# Patient Record
Sex: FEMALE | Race: WHITE | NOT HISPANIC OR LATINO | Employment: FULL TIME | URBAN - NONMETROPOLITAN AREA
[De-identification: names, ages, dates, MRNs, and addresses within clinical notes are randomized per-mention and may not be internally consistent; named-entity substitution may affect disease eponyms.]

---

## 2023-05-03 ENCOUNTER — HOSPITAL ENCOUNTER (EMERGENCY)
Facility: OTHER | Age: 23
Discharge: LEFT BEFORE DISCHARGE | End: 2023-05-03
Payer: COMMERCIAL

## 2023-05-03 VITALS
TEMPERATURE: 98.7 F | RESPIRATION RATE: 16 BRPM | OXYGEN SATURATION: 99 % | SYSTOLIC BLOOD PRESSURE: 120 MMHG | DIASTOLIC BLOOD PRESSURE: 51 MMHG | HEART RATE: 67 BPM

## 2023-05-04 NOTE — ED TRIAGE NOTES
Pt arrives with c/o headache for three days, pt states it isn't really pain, more so discomfort. Pt states she has attempted tylenol and midal with minimal relief, but does not make it go away completely.      Triage Assessment     Row Name 05/03/23 2007       Triage Assessment (Adult)    Airway WDL WDL       Respiratory WDL    Respiratory WDL WDL       Skin Circulation/Temperature WDL    Skin Circulation/Temperature WDL WDL       Cardiac WDL    Cardiac WDL WDL       Peripheral/Neurovascular WDL    Peripheral Neurovascular WDL WDL       Cognitive/Neuro/Behavioral WDL    Cognitive/Neuro/Behavioral WDL WDL

## 2024-06-29 ENCOUNTER — HOSPITAL ENCOUNTER (EMERGENCY)
Facility: OTHER | Age: 24
Discharge: HOME | End: 2024-06-29
Attending: PHYSICIAN ASSISTANT | Admitting: PHYSICIAN ASSISTANT

## 2024-06-29 VITALS
OXYGEN SATURATION: 99 % | BODY MASS INDEX: 19.49 KG/M2 | TEMPERATURE: 97.8 F | WEIGHT: 110 LBS | HEIGHT: 63 IN | HEART RATE: 68 BPM | RESPIRATION RATE: 18 BRPM | DIASTOLIC BLOOD PRESSURE: 76 MMHG | SYSTOLIC BLOOD PRESSURE: 118 MMHG

## 2024-06-29 DIAGNOSIS — N89.8 VAGINAL DISCHARGE: ICD-10-CM

## 2024-06-29 DIAGNOSIS — B37.31 YEAST INFECTION OF THE VAGINA: ICD-10-CM

## 2024-06-29 LAB
BACTERIAL VAGINOSIS VAG-IMP: NEGATIVE
C TRACH DNA SPEC QL PROBE+SIG AMP: NEGATIVE
CANDIDA DNA VAG QL NAA+PROBE: DETECTED
CANDIDA GLABRATA / CANDIDA KRUSEI DNA: NOT DETECTED
N GONORRHOEA DNA SPEC QL NAA+PROBE: NEGATIVE
T VAGINALIS DNA VAG QL NAA+PROBE: NOT DETECTED

## 2024-06-29 PROCEDURE — 99283 EMERGENCY DEPT VISIT LOW MDM: CPT | Performed by: PHYSICIAN ASSISTANT

## 2024-06-29 PROCEDURE — 0352U MULTIPLEX VAGINAL PANEL BY PCR: CPT | Performed by: PHYSICIAN ASSISTANT

## 2024-06-29 PROCEDURE — 99284 EMERGENCY DEPT VISIT MOD MDM: CPT | Performed by: PHYSICIAN ASSISTANT

## 2024-06-29 PROCEDURE — 87491 CHLMYD TRACH DNA AMP PROBE: CPT | Performed by: PHYSICIAN ASSISTANT

## 2024-06-29 RX ORDER — ALBUTEROL SULFATE 90 UG/1
2 AEROSOL, METERED RESPIRATORY (INHALATION)
COMMUNITY

## 2024-06-29 RX ORDER — FLUCONAZOLE 150 MG/1
TABLET ORAL
Qty: 2 TABLET | Refills: 0 | Status: SHIPPED | OUTPATIENT
Start: 2024-06-29 | End: 2024-07-02

## 2024-06-29 ASSESSMENT — ACTIVITIES OF DAILY LIVING (ADL): ADLS_ACUITY_SCORE: 33

## 2024-06-29 ASSESSMENT — COLUMBIA-SUICIDE SEVERITY RATING SCALE - C-SSRS
2. HAVE YOU ACTUALLY HAD ANY THOUGHTS OF KILLING YOURSELF IN THE PAST MONTH?: NO
1. IN THE PAST MONTH, HAVE YOU WISHED YOU WERE DEAD OR WISHED YOU COULD GO TO SLEEP AND NOT WAKE UP?: NO
6. HAVE YOU EVER DONE ANYTHING, STARTED TO DO ANYTHING, OR PREPARED TO DO ANYTHING TO END YOUR LIFE?: YES

## 2024-06-29 NOTE — ED PROVIDER NOTES
EMERGENCY DEPARTMENT ENCOUNTER      NAME: Carol Patterson  AGE: 23 year old female  YOB: 2000  MRN: [id number]  EVALUATION DATE & TIME: 6/29/2024 12:14 AM    PCP: No Ref-Primary, Physician    ED PROVIDER: Nir Barone PA-C       CHIEF COMPLAINT:  Chief Complaint   Patient presents with    Exposure to STD         HPI  Carol Patterson is a pleasant 23 year old female who presents to the ER today for concerns of possible yeast infection versus STI.  Patient states that she has been having whitish vaginal discharge as well as a burning dysuria for the past few days.  Patient does endorse having unprotected sex for the next boyfriend about 3 to 4 weeks ago.  States that her vaginal irritation has become worse over the past 2 days.  No abdominal or flank pain.  Slight lower back discomfort.  No fevers or chills.  No nausea or vomiting.  Patient says that she just wants swabbing for gonorrhea and chlamydia as well as a wet prep for yeast infection and vaginosis as she does not have insurance.      REVIEW OF SYSTEMS   Review of Systems  As above, otherwise ROS is unremarkable.      PAST MEDICAL HISTORY:  No past medical history on file.      PAST SURGICAL HISTORY:  No past surgical history on file.      CURRENT MEDICATIONS:    Current Outpatient Medications   Medication Instructions    albuterol (PROAIR HFA/PROVENTIL HFA/VENTOLIN HFA) 108 (90 Base) MCG/ACT inhaler 2 puffs, Inhalation         ALLERGIES:  Allergies   Allergen Reactions    Dust Mite Extract Shortness Of Breath    Influenza Virus Vaccine          FAMILY HISTORY:  No family history on file.      SOCIAL HISTORY:   Social History     Socioeconomic History    Marital status:    Tobacco Use    Smoking status: Never    Smokeless tobacco: Never   Substance and Sexual Activity    Alcohol use: Yes     Comment: 1 beer a night    Drug use: Never     Social Determinants of Health      Received from [employer] [facility]  "[facility], [employer] [email or web address]    Financial Resource Strain    [facility] [employer] [email or web address], [employer] [email or web address]    Social Connections       ==================================================================================================================================    PHYSICAL EXAM    VITAL SIGNS: /76   Pulse 68   Temp 97.8  F (36.6  C) (Tympanic)   Resp 18   Ht 1.6 m (5' 3\")   Wt 49.9 kg (110 lb)   LMP 06/05/2024 (Approximate)   SpO2 99%   BMI 19.49 kg/m      Patient Vitals for the past 24 hrs:   BP Temp Temp src Pulse Resp SpO2 Height Weight   06/29/24 0009 118/76 97.8  F (36.6  C) Tympanic 68 18 99 % 1.6 m (5' 3\") 49.9 kg (110 lb)       Physical Exam  Vitals and nursing note reviewed.   Constitutional:       Appearance: Normal appearance.   HENT:      Nose: Nose normal.      Mouth/Throat:      Mouth: Mucous membranes are moist.      Pharynx: Oropharynx is clear.   Eyes:      Conjunctiva/sclera: Conjunctivae normal.   Cardiovascular:      Rate and Rhythm: Normal rate and regular rhythm.      Pulses: Normal pulses.      Heart sounds: Normal heart sounds.   Pulmonary:      Effort: Pulmonary effort is normal.      Breath sounds: Normal breath sounds.   Abdominal:      General: Abdomen is flat.      Palpations: Abdomen is soft.      Tenderness: There is no abdominal tenderness.   Genitourinary:     Comments: Pelvic exam deferred  Musculoskeletal:         General: Normal range of motion.      Cervical back: Normal range of motion.   Skin:     General: Skin is warm and dry.      Capillary Refill: Capillary refill takes less than 2 seconds.   Neurological:      General: No focal deficit present.      Mental Status: She is alert.   Psychiatric:         Mood and Affect: Mood normal.          " "  ==================================================================================================================================    LABS & RADIOLOGY:  All pertinent labs reviewed and interpreted. Reviewed all pertinent imaging. Please see official radiology report.     No orders to display           ==================================================================================================================================    ED COURSE, MEDICAL DECISION MAKING, FINAL IMPRESSION AND PLAN:     Assessment / Plan:  1. Vaginal discharge        The patient was interviewed and examined.  HPI and physical exam as below.  Differential diagnosis and MDM Key Documentation Elements as below.  Vitals, triage note, and nursing notes were reviewed.  /76   Pulse 68   Temp 97.8  F (36.6  C) (Tympanic)   Resp 18   Ht 1.6 m (5' 3\")   Wt 49.9 kg (110 lb)   LMP 06/05/2024 (Approximate)   SpO2 99%   BMI 19.49 kg/m      Differential includes but is not limited to yeast infection, bacterial vaginosis, gonorrhea, chlamydia, UTI    Patient is afebrile with otherwise normal vitals.  Patient is in no distress.  No abdominal tenderness.  Vaginal exam was deferred.  Patient did self swabs for NDP for vaginosis/yeast infection as well as gonorrhea and chlamydia.    Results pending.  Discussed with ED attending physician, Dr. Rhoades, who will accept care of patient and follow results.  Patient was stable at time of transfer of care.    Pertinent Labs & Imaging studies reviewed. (See chart for details)     No results found for: \"ABORH\"      Reassessments, Medications, Interventions, & Response to Treatments:  -as above    Medications given during today's ER visit:  Medications - No data to display    Consultations:  None    Decision Rules, Medical Calculators, and Risk Stratification Tools:  None    MDM Key Documentation Elements for Patient's Evaluation:  Differential diagnosis to include high risk considerations: As " above  Escalation to admission/observation considered: Admission/observation considered, but patient does not meet admission criteria  Discussions and management with other clinicians:    3a. Independent interpretation of testing performed by another health professional:  -No  3b. Discussion of management or test interpretation with another health professional: -No  Independent interpretation of tests:  Ordering and/or review of 3+ test(s)  Discussion of test interpretations with radiology:  No  History obtained from source other than patient or assessment requiring an independent historian:  No  Review of non-ED/external records:  review of 1 records  Diagnostic tests considered but not ultimately performed/deferred:  -HIV, RPR, HSV  Prescription medications considered but not prescribed:  -Will be dependent on swab results  Chronic conditions affecting care:  -None  Care affected by social determinants of health:  -None    The patient's management involved:   - Laboratory studies    A shared decision making model was used. Time was taken to answer all questions.  Patient and/or associated parties understood and were agreeable to treatment plan.  Plan and all results were discussed. Warning signs and close return precautions to return to the ED given. Copy of results given. Discharged in stable condition. Discharged with discharge instructions outlining plan for further care and follow up.      New prescriptions started at today's ER visit  New Prescriptions    No medications on file       ==================================================================================================================================      ICalixto PA-C, personally performed the services described in this documentation, and it is both accurate and complete.       Nir Barone PA-C  06/29/24 0121

## 2024-06-29 NOTE — ED PROVIDER NOTES
06/29/24   12:59 AM     I am accepting the care of this patient from Calixto Barone at change of shift.  Carol Patterson is a 22 yo female  [place] with her ex about 3-4 weeks ago  History of yeast infections, so thought this was the same, but now thicker and does not seem like yeast infection  Labs drawn and MVP pending, UA and UPT not collected yet    ED Course    1:42 AM  Her testing is positive for yeast, we will treat with diflucan. I printed a Rx since she wants to shop for the best cash price.     Diagnosis    (N89.8) Vaginal discharge    (B37.31) Yeast infection of the vagina      Plan: discharge            Jewel Rhoades MD  06/29/24 0142     Post-Care Instructions: I reviewed with the patient in detail post-care instructions. Patient is not to engage in any heavy lifting, exercise, or swimming for the next 14 days. Should the patient develop any fevers, chills, bleeding, severe pain patient will contact the office immediately.

## 2024-06-29 NOTE — DISCHARGE INSTRUCTIONS
Carol    I recommend that you shop around a bit for the best price for diflucan.  I think [facility] will be very competitive.    Thank you for choosing our Emergency Department for your care.     You may receive a phone call or letter for a survey about your care in our ED.  Please complete this as this is how we improve care for our patients.     If you have any questions after leaving the ED you can call or text me on my cell phone at [phone].  I am not on call so if I do not answer my phone please leave a message- I will get back to you.  If you are not doing well please return to the ED.     Sincerely,    Dr Juan Carlos Rhoades M.D.

## 2024-06-29 NOTE — ED TRIAGE NOTES
"Pt presents to ED via private car with c/o possible STD exposure 3-4 weeks ago. Pt c/o white vaginal discharge with burning/itching that started 3 days ago. /76   Pulse 68   Temp 97.8  F (36.6  C) (Tympanic)   Resp 18   Ht 1.6 m (5' 3\")   Wt 49.9 kg (110 lb)   SpO2 99%   BMI 19.49 kg/m         Triage Assessment (Adult)       Row Name 06/29/24 0010          Triage Assessment    Airway WDL WDL        Respiratory WDL    Respiratory WDL WDL        Skin Circulation/Temperature WDL    Skin Circulation/Temperature WDL WDL        Cardiac WDL    Cardiac WDL WDL        Peripheral/Neurovascular WDL    Peripheral Neurovascular WDL WDL        Cognitive/Neuro/Behavioral WDL    Cognitive/Neuro/Behavioral WDL WDL                     "

## 2024-07-22 ENCOUNTER — HOSPITAL ENCOUNTER (EMERGENCY)
Facility: OTHER | Age: 24
Discharge: HOME | End: 2024-07-22
Attending: EMERGENCY MEDICINE | Admitting: EMERGENCY MEDICINE

## 2024-07-22 ENCOUNTER — APPOINTMENT (OUTPATIENT)
Dept: ULTRASOUND IMAGING | Facility: OTHER | Age: 24
End: 2024-07-22
Attending: EMERGENCY MEDICINE

## 2024-07-22 VITALS
TEMPERATURE: 98.4 F | DIASTOLIC BLOOD PRESSURE: 61 MMHG | HEIGHT: 63 IN | SYSTOLIC BLOOD PRESSURE: 109 MMHG | HEART RATE: 67 BPM | WEIGHT: 115 LBS | RESPIRATION RATE: 20 BRPM | OXYGEN SATURATION: 100 % | BODY MASS INDEX: 20.38 KG/M2

## 2024-07-22 DIAGNOSIS — N83.201 RIGHT OVARIAN CYST: ICD-10-CM

## 2024-07-22 DIAGNOSIS — Z34.91 FIRST TRIMESTER PREGNANCY: ICD-10-CM

## 2024-07-22 LAB
ABO/RH(D): NORMAL
ALBUMIN UR-MCNC: NEGATIVE MG/DL
ANION GAP SERPL CALCULATED.3IONS-SCNC: 11 MMOL/L (ref 7–15)
ANTIBODY SCREEN: NEGATIVE
APPEARANCE UR: CLEAR
BACTERIA #/AREA URNS HPF: ABNORMAL /HPF
BACTERIAL VAGINOSIS VAG-IMP: NEGATIVE
BASOPHILS # BLD AUTO: 0 10E3/UL (ref 0–0.2)
BASOPHILS NFR BLD AUTO: 0 %
BILIRUB UR QL STRIP: NEGATIVE
BUN SERPL-MCNC: 11.5 MG/DL (ref 6–20)
C TRACH DNA SPEC QL PROBE+SIG AMP: NEGATIVE
CALCIUM SERPL-MCNC: 9.3 MG/DL (ref 8.8–10.4)
CANDIDA DNA VAG QL NAA+PROBE: NOT DETECTED
CANDIDA GLABRATA / CANDIDA KRUSEI DNA: NOT DETECTED
CHLORIDE SERPL-SCNC: 102 MMOL/L (ref 98–107)
COLOR UR AUTO: ABNORMAL
CREAT SERPL-MCNC: 0.63 MG/DL (ref 0.51–0.95)
EGFRCR SERPLBLD CKD-EPI 2021: >90 ML/MIN/1.73M2
EOSINOPHIL # BLD AUTO: 0.1 10E3/UL (ref 0–0.7)
EOSINOPHIL NFR BLD AUTO: 1 %
ERYTHROCYTE [DISTWIDTH] IN BLOOD BY AUTOMATED COUNT: 14.3 % (ref 10–15)
GLUCOSE SERPL-MCNC: 87 MG/DL (ref 70–99)
GLUCOSE UR STRIP-MCNC: NEGATIVE MG/DL
HCG INTACT+B SERPL-ACNC: ABNORMAL MIU/ML
HCO3 SERPL-SCNC: 23 MMOL/L (ref 22–29)
HCT VFR BLD AUTO: 33.4 % (ref 35–47)
HGB BLD-MCNC: 10.8 G/DL (ref 11.7–15.7)
HGB UR QL STRIP: NEGATIVE
HYALINE CASTS: 1 /LPF
IMM GRANULOCYTES # BLD: 0 10E3/UL
IMM GRANULOCYTES NFR BLD: 0 %
KETONES UR STRIP-MCNC: NEGATIVE MG/DL
LEUKOCYTE ESTERASE UR QL STRIP: ABNORMAL
LYMPHOCYTES # BLD AUTO: 2.1 10E3/UL (ref 0.8–5.3)
LYMPHOCYTES NFR BLD AUTO: 23 %
MCH RBC QN AUTO: 29.3 PG (ref 26.5–33)
MCHC RBC AUTO-ENTMCNC: 32.3 G/DL (ref 31.5–36.5)
MCV RBC AUTO: 91 FL (ref 78–100)
MONOCYTES # BLD AUTO: 0.8 10E3/UL (ref 0–1.3)
MONOCYTES NFR BLD AUTO: 8 %
MUCOUS THREADS #/AREA URNS LPF: PRESENT /LPF
N GONORRHOEA DNA SPEC QL NAA+PROBE: NEGATIVE
NEUTROPHILS # BLD AUTO: 6.2 10E3/UL (ref 1.6–8.3)
NEUTROPHILS NFR BLD AUTO: 67 %
NITRATE UR QL: NEGATIVE
NRBC # BLD AUTO: 0 10E3/UL
NRBC BLD AUTO-RTO: 0 /100
PH UR STRIP: 7 [PH] (ref 5–9)
PLATELET # BLD AUTO: 298 10E3/UL (ref 150–450)
POTASSIUM SERPL-SCNC: 4.2 MMOL/L (ref 3.4–5.3)
RBC # BLD AUTO: 3.68 10E6/UL (ref 3.8–5.2)
RBC URINE: 1 /HPF
SODIUM SERPL-SCNC: 136 MMOL/L (ref 135–145)
SP GR UR STRIP: 1.01 (ref 1–1.03)
SPECIMEN EXPIRATION DATE: NORMAL
SQUAMOUS EPITHELIAL: 2 /HPF
T VAGINALIS DNA VAG QL NAA+PROBE: NOT DETECTED
UROBILINOGEN UR STRIP-MCNC: NORMAL MG/DL
WBC # BLD AUTO: 9.3 10E3/UL (ref 4–11)
WBC URINE: 3 /HPF

## 2024-07-22 PROCEDURE — 85025 COMPLETE CBC W/AUTO DIFF WBC: CPT | Performed by: EMERGENCY MEDICINE

## 2024-07-22 PROCEDURE — 76801 OB US < 14 WKS SINGLE FETUS: CPT

## 2024-07-22 PROCEDURE — 0352U MULTIPLEX VAGINAL PANEL BY PCR: CPT | Performed by: EMERGENCY MEDICINE

## 2024-07-22 PROCEDURE — 87491 CHLMYD TRACH DNA AMP PROBE: CPT | Performed by: EMERGENCY MEDICINE

## 2024-07-22 PROCEDURE — 84702 CHORIONIC GONADOTROPIN TEST: CPT | Performed by: EMERGENCY MEDICINE

## 2024-07-22 PROCEDURE — 81001 URINALYSIS AUTO W/SCOPE: CPT | Performed by: EMERGENCY MEDICINE

## 2024-07-22 PROCEDURE — 82565 ASSAY OF CREATININE: CPT | Performed by: EMERGENCY MEDICINE

## 2024-07-22 PROCEDURE — 99283 EMERGENCY DEPT VISIT LOW MDM: CPT | Performed by: EMERGENCY MEDICINE

## 2024-07-22 PROCEDURE — 99285 EMERGENCY DEPT VISIT HI MDM: CPT | Mod: 25 | Performed by: EMERGENCY MEDICINE

## 2024-07-22 PROCEDURE — 87086 URINE CULTURE/COLONY COUNT: CPT | Performed by: EMERGENCY MEDICINE

## 2024-07-22 PROCEDURE — 86900 BLOOD TYPING SEROLOGIC ABO: CPT | Performed by: EMERGENCY MEDICINE

## 2024-07-22 PROCEDURE — 81001 URINALYSIS AUTO W/SCOPE: CPT | Performed by: FAMILY MEDICINE

## 2024-07-22 PROCEDURE — 82374 ASSAY BLOOD CARBON DIOXIDE: CPT | Performed by: EMERGENCY MEDICINE

## 2024-07-22 PROCEDURE — 36415 COLL VENOUS BLD VENIPUNCTURE: CPT | Performed by: EMERGENCY MEDICINE

## 2024-07-22 RX ORDER — ONDANSETRON 4 MG/1
4 TABLET, ORALLY DISINTEGRATING ORAL EVERY 8 HOURS PRN
Qty: 10 TABLET | Refills: 0 | Status: SHIPPED | OUTPATIENT
Start: 2024-07-22

## 2024-07-22 RX ORDER — ONDANSETRON 4 MG/1
4 TABLET, ORALLY DISINTEGRATING ORAL EVERY 8 HOURS PRN
Qty: 10 TABLET | Refills: 0 | Status: SHIPPED | OUTPATIENT
Start: 2024-07-22 | End: 2024-07-25

## 2024-07-22 ASSESSMENT — COLUMBIA-SUICIDE SEVERITY RATING SCALE - C-SSRS
1. IN THE PAST MONTH, HAVE YOU WISHED YOU WERE DEAD OR WISHED YOU COULD GO TO SLEEP AND NOT WAKE UP?: NO
2. HAVE YOU ACTUALLY HAD ANY THOUGHTS OF KILLING YOURSELF IN THE PAST MONTH?: NO
6. HAVE YOU EVER DONE ANYTHING, STARTED TO DO ANYTHING, OR PREPARED TO DO ANYTHING TO END YOUR LIFE?: NO

## 2024-07-22 ASSESSMENT — ACTIVITIES OF DAILY LIVING (ADL)
ADLS_ACUITY_SCORE: 33

## 2024-07-22 NOTE — ED NOTES
Pt refusing transvaginal ultrasound, MD aware. Writer told pt we would attempt to call ultrasound and see if they could complete it abdominally.

## 2024-07-22 NOTE — ED PROVIDER NOTES
"  History     Chief Complaint   Patient presents with    Pregnancy Complications     HPI  Carol Patterson is a 23 year old female who is G2 para 1 with positive pregnancy test she thinks she is about 6 weeks.  For the last couple of weeks she has been having right-sided pelvic pain is intermittent at times severe she has also had some brown vaginal discharge.  She never had an ectopic pregnancy has not had prior abdominal surgeries.  She has not started prenatal care and does not have particular plans to do so.    Allergies:  Allergies   Allergen Reactions    Cats Shortness Of Breath    Dust Mite Extract Shortness Of Breath    Influenza Vaccines Nausea and Vomiting     Groggy/fever  Patient states she does not believe this was an allergic reaction 3[birth year]16/[birth year].    Influenza Virus Vaccine        Problem List:    There are no problems to display for this patient.       Past Medical History:    No past medical history on file.    Past Surgical History:    No past surgical history on file.    Family History:    No family history on file.    Social History:  Marital Status:   [2]  Social History     Tobacco Use    Smoking status: Never    Smokeless tobacco: Never   Substance Use Topics    Alcohol use: Yes     Comment: 1 beer a night    Drug use: Never        Medications:    ondansetron (ZOFRAN ODT) 4 MG ODT tab  ondansetron (ZOFRAN ODT) 4 MG ODT tab  albuterol (PROAIR HFA/PROVENTIL HFA/VENTOLIN HFA) 108 (90 Base) MCG/ACT inhaler          Review of Systems    Physical Exam   BP: 116/66  Pulse: 71  Temp: 98.9  F (37.2  C)  Resp: 16  Height: 160 cm (5' 3\")  Weight: 52.2 kg (115 lb)  SpO2: 100 %      Physical Exam  Abdominal:      Palpations: Abdomen is soft.      Tenderness: There is abdominal tenderness.          Comments: Right pelvis, inferior to McBurney's point   Genitourinary:     Comments: Patient declined a pelvic examination.  She was willing to do her own swabs which were done        ED Course "       Discussed with the patient my concerns and that we would do labs urinalysis pelvic cultures and pelvic ultrasound.  The patient did not want the transvaginal probe and also declined my doing a pelvic examination.  I told her my concern was for possible ectopic pregnancy on the right side and that we would get a better view with the transvaginal which she consented to.     Procedures    US OB < 14 Weeks Single   Final Result   Impression:    Single living intrauterine pregnancy with an ultrasound age of  7   weeks 3 days and an estimated date of confinement of 3/7/2025.      Small subchorionic hemorrhage.      SANDRA LEE MD            SYSTEM ID:  [id number]        4:36 PM discussed results of the ultrasound with the patient she was unable to stay for the results of the pelvic cultures as she and her partner had to leave for their child.  I strongly advise close OB follow-up, starting prenatal vitamins, avoiding smoking and alcohol.  I believe her right lower quadrant pain is from the likely corpus luteal cyst and not from another cause such as appendicitis.           Results for orders placed or performed during the hospital encounter of 07/22/24 (from the past 24 hour(s))   UA with Microscopic reflex to Culture    Specimen: Urine, Midstream   Result Value Ref Range    Color Urine Light Yellow Colorless, Straw, Light Yellow, Yellow    Appearance Urine Clear Clear    Glucose Urine Negative Negative mg/dL    Bilirubin Urine Negative Negative    Ketones Urine Negative Negative mg/dL    Specific Gravity Urine 1.012 1.000 - 1.030    Blood Urine Negative Negative    pH Urine 7.0 5.0 - 9.0    Protein Albumin Urine Negative Negative mg/dL    Urobilinogen Urine Normal Normal, 2.0 mg/dL    Nitrite Urine Negative Negative    Leukocyte Esterase Urine Moderate (A) Negative    Bacteria Urine Few (A) None Seen /HPF    Mucus Urine Present (A) None Seen /LPF    RBC Urine 1 <=2 /HPF    WBC Urine 3 <=5 /HPF    Squamous  Epithelials Urine 2 (H) <=1 /HPF    Hyaline Casts Urine 1 <=2 /LPF    Narrative    Urine Culture ordered based on laboratory criteria   CBC with platelets differential    Narrative    The following orders were created for panel order CBC with platelets differential.  Procedure                               Abnormality         Status                     ---------                               -----------         ------                     CBC with platelets and d...[[id number]]  Abnormal            Final result                 Please view results for these tests on the individual orders.   ABO/Rh type and screen    Narrative    The following orders were created for panel order ABO/Rh type and screen.  Procedure                               Abnormality         Status                     ---------                               -----------         ------                     Adult Type and Screen[[id number]]                            Final result                 Please view results for these tests on the individual orders.   Basic metabolic panel   Result Value Ref Range    Sodium 136 135 - 145 mmol/L    Potassium 4.2 3.4 - 5.3 mmol/L    Chloride 102 98 - 107 mmol/L    Carbon Dioxide (CO2) 23 22 - 29 mmol/L    Anion Gap 11 7 - 15 mmol/L    Urea Nitrogen 11.5 6.0 - 20.0 mg/dL    Creatinine 0.63 0.51 - 0.95 mg/dL    GFR Estimate >90 >60 mL/min/1.73m2    Calcium 9.3 8.8 - 10.4 mg/dL    Glucose 87 70 - 99 mg/dL   HCG quantitative pregnancy   Result Value Ref Range    hCG Quantitative 50,630 (H) <5 mIU/mL   CBC with platelets and differential   Result Value Ref Range    WBC Count 9.3 4.0 - 11.0 10e3/uL    RBC Count 3.68 (L) 3.80 - 5.20 10e6/uL    Hemoglobin 10.8 (L) 11.7 - 15.7 g/dL    Hematocrit 33.4 (L) 35.0 - 47.0 %    MCV 91 78 - 100 fL    MCH 29.3 26.5 - 33.0 pg    MCHC 32.3 31.5 - 36.5 g/dL    RDW 14.3 10.0 - 15.0 %    Platelet Count 298 150 - 450 10e3/uL    % Neutrophils 67 %    % Lymphocytes 23 %    % Monocytes 8 %     % Eosinophils 1 %    % Basophils 0 %    % Immature Granulocytes 0 %    NRBCs per 100 WBC 0 <1 /100    Absolute Neutrophils 6.2 1.6 - 8.3 10e3/uL    Absolute Lymphocytes 2.1 0.8 - 5.3 10e3/uL    Absolute Monocytes 0.8 0.0 - 1.3 10e3/uL    Absolute Eosinophils 0.1 0.0 - 0.7 10e3/uL    Absolute Basophils 0.0 0.0 - 0.2 10e3/uL    Absolute Immature Granulocytes 0.0 <=0.4 10e3/uL    Absolute NRBCs 0.0 10e3/uL   Adult Type and Screen   Result Value Ref Range    ABO/RH(D) A POS     Antibody Screen Negative Negative    SPECIMEN EXPIRATION DATE [id number]    Multiplex Vaginal Panel by PCR    Specimen: Vagina; Swab   Result Value Ref Range    Bacterial Vaginosis Organism DNA Negative Negative    Candida Group DNA Not Detected Not Detected    Candida glabrata / Leigh krusei DNA Not Detected Not Detected    Trichomonas vaginalis DNA Not Detected Not Detected    Narrative    The Xpert  Xpress MVP test, performed on the [employer], is an automated, qualitative in vitro diagnostic test for the detection of DNA targets from anaerobic bacteria associated with bacterial vaginosis, Candida species associated with vulvovaginal candidiasis, and Trichomonas vaginalis. The assay uses clinician-collected and self-collected vaginal swabs from patients who are symptomatic for vaginitis/ vaginosis. The Xpert  Xpress MVP test utilizes real-time polymerase chain reaction (PCR) for the amplification of specific DNA targets and utilizes fluorogenic target-specific hybridization probes to detect and differentiate DNA. It is intended to aid in the diagnosis of vaginal infections in women with a clinical presentation consistent with bacterial vaginosis, vulvovaginal candidiasis, or trichomoniasis.   The assay targets three anaerobic microorgansims that are associated with bacterial vaginosis (BV). Other organisms that are not detected by the Xpert  Xpress MVP test have also been reported to be associated with BV. The BV  organism and Candida species targets of the Xpert  Xpress MVP test can be commensal in women; positive results must be considered in conjunction with other clinical and patient information to determine the disease status.   US OB < 14 Weeks Single    Narrative    Procedure:  US OB < 14 WEEKS SINGLE-TRANSABDOMINAL    TECHNIQUE: Transabdominal OB ultrasound was performed with color,  grayscale , color duplex/Doppler evaluation.    Gestational age by LMP: Unknown    Clinical history: Positive pregnancy test with abdominal pain and  brown discharge.    Comparisons: No relevant prior imaging.    Gestation number: 1    Cardiac activity:  Yes    Heart rate:  142 BPM    Measurements:      CRL:  12.1 mm, 7 weeks 3 days    Adnexa:  The right ovary appears to contain a corpus luteal cyst.    Ultrasound Age:  7 weeks 3 days    Ultrasound EDC:  3/7/2025      Impression    Impression:   Single living intrauterine pregnancy with an ultrasound age of  7  weeks 3 days and an estimated date of confinement of 3/7/2025.    Small subchorionic hemorrhage.    SANDRA LEE MD         SYSTEM ID:  [id number]       Medications - No data to display    Assessments & Plan (with Medical Decision Making)   23-year-old G2 para 1 comes in complaining of right pelvic pain with positive pregnancy test but no further workup.  Her quantitative hCG was 50,000 and her pelvic ultrasound shows 7-week 3-day IUP with right corpus luteal cyst.  She does not have peritonitis on exam I think this is likely the cause of her pain as she has had this similar cyst with pain previously.  No evidence for urinary infection her pelvic swabs returned after she left and are negative except for Leigh.  She understands my concern that she needs to establish OB care as soon as possible either with the family clinic or with OB/GYN.  We discussed pain control with Tylenol avoiding alcohol and avoiding smoking.  She should return to the emergency department if her condition  changes.  I have reviewed the nursing notes.    I have reviewed the findings, diagnosis, plan and need for follow up with the patient.          Medical Decision Making  The patient's presentation was of moderate complexity (patient with positive home pregnancy test but no prenatal care unsure of dates complaining of right-sided pelvic pain.).    The patient's evaluation involved:  ordering and/or review of 3+ test(s) in this encounter (quantitative hCG, UA, pelvic ultrasound, wet prep, GC, chlamydia)    The patient's management necessitated only low risk treatment.  Strongly recommend patient make a timely clinic follow-up appointment for prenatal care.        Discharge Medication List as of 7/22/2024  4:28 PM        START taking these medications    Details   !! ondansetron (ZOFRAN ODT) 4 MG ODT tab Take 1 tablet (4 mg) by mouth every 8 hours as needed for nausea or vomiting, Disp-10 tablet, R-0, Local Print      !! ondansetron (ZOFRAN ODT) 4 MG ODT tab Take 1 tablet (4 mg) by mouth every 8 hours as needed for nausea, Disp-10 tablet, R-0, Local Print       !! - Potential duplicate medications found. Please discuss with provider.          Final diagnoses:   First trimester pregnancy   Right ovarian cyst       7/22/2024   [facility]""       [facility] zhou MD  07/22/24 8314

## 2024-07-22 NOTE — ED NOTES
Patient re-evaluated. She notes a slight increase in her RLQ pain, but denies any vaginal bleeding. UA sent to lab. Patient given an update and a warm pack for comfort.

## 2024-07-22 NOTE — ED TRIAGE NOTES
Pt presents to ED 6-7wks gestation. Pt developed RLQ pain, vaginal pain, and low back pain. Pt also reports greenish brownish discharge but denies bleeding.    Francisca Yancey RN on 7/22/2024 at 10:50 AM       Triage Assessment (Adult)       Row Name 07/22/24 1049          Cognitive/Neuro/Behavioral WDL    Cognitive/Neuro/Behavioral WDL WDL

## 2024-07-22 NOTE — DISCHARGE INSTRUCTIONS
Your pregnancy is at 7 weeks and 3 days.  I recommend you start prenatal vitamins avoid smoking and alcohol  Start prenatal care through a family clinic as soon as possible  You have a cyst on your right ovary that is likely causing pain  You may take Tylenol for pain  Use Zofran as needed for nausea or vomiting  Return if your symptoms worsen

## 2024-07-22 NOTE — ED NOTES
Pt very tearful, scared to do transvaginal ultrasound due to past traumas. Writer re-assured pt that she has the right to refuse; ultrasound tech going to attempt abdominal instead.

## 2024-07-24 LAB — BACTERIA UR CULT: NO GROWTH

## 2024-07-24 NOTE — RESULT ENCOUNTER NOTE
Final urine culture report is negative.  Adult: Negative urine culture parameters per protocol: No growth   [facility] Emergency Dept discharge antibiotic prescribed (If applicable): None  Treatment recommendations per [facility] ED Lab Result Urine Culture protocol: No change in plan of care.

## 2024-08-06 PROBLEM — O43.199 MARGINAL INSERTION OF UMBILICAL CORD AFFECTING MANAGEMENT OF MOTHER: Status: ACTIVE | Noted: 2020-09-24

## 2024-08-06 PROBLEM — O99.013 ANEMIA DURING PREGNANCY IN THIRD TRIMESTER: Status: ACTIVE | Noted: 2021-01-21

## 2024-08-06 PROBLEM — Z97.5 NEXPLANON IN PLACE: Status: ACTIVE | Noted: 2021-03-11

## 2024-08-06 PROBLEM — O36.5930 POOR FETAL GROWTH AFFECTING MANAGEMENT OF MOTHER IN THIRD TRIMESTER: Status: ACTIVE | Noted: 2021-01-04

## 2024-08-07 ENCOUNTER — VIRTUAL VISIT (OUTPATIENT)
Dept: OBGYN | Facility: OTHER | Age: 24
End: 2024-08-07

## 2024-08-07 DIAGNOSIS — Z34.91 FIRST TRIMESTER PREGNANCY: ICD-10-CM

## 2024-08-07 DIAGNOSIS — Z32.01 PREGNANCY TEST POSITIVE: Primary | ICD-10-CM

## 2024-08-07 PROCEDURE — 99207 PR OB VISIT-NO CHARGE - GICH ONLY: CPT | Mod: 93

## 2024-08-07 ASSESSMENT — ANXIETY QUESTIONNAIRES
GAD7 TOTAL SCORE: 2
IF YOU CHECKED OFF ANY PROBLEMS ON THIS QUESTIONNAIRE, HOW DIFFICULT HAVE THESE PROBLEMS MADE IT FOR YOU TO DO YOUR WORK, TAKE CARE OF THINGS AT HOME, OR GET ALONG WITH OTHER PEOPLE: NOT DIFFICULT AT ALL
GAD7 TOTAL SCORE: 2
5. BEING SO RESTLESS THAT IT IS HARD TO SIT STILL: NOT AT ALL
2. NOT BEING ABLE TO STOP OR CONTROL WORRYING: NOT AT ALL
3. WORRYING TOO MUCH ABOUT DIFFERENT THINGS: SEVERAL DAYS
1. FEELING NERVOUS, ANXIOUS, OR ON EDGE: NOT AT ALL
7. FEELING AFRAID AS IF SOMETHING AWFUL MIGHT HAPPEN: NOT AT ALL
6. BECOMING EASILY ANNOYED OR IRRITABLE: SEVERAL DAYS

## 2024-08-07 ASSESSMENT — PATIENT HEALTH QUESTIONNAIRE - PHQ9
SUM OF ALL RESPONSES TO PHQ QUESTIONS 1-9: 6
5. POOR APPETITE OR OVEREATING: NOT AT ALL

## 2024-08-07 NOTE — PROGRESS NOTES
Verbal consent obtained for telephone visit. Total length of call: 20 min    HPI:    This is a 23 year old female patient,  who was called today for OB Intake visit. Patient reports positive pregnancy test at home.     Obstetrical history and OB Demographics updated to the best of this nurse's ability based on patient report. PHQ-9 depression screening and routine Domestic Abuse screening completed. All immediate questions and concerns answered.    FOOD SECURITY SCREENING QUESTIONS  Hunger Vital Signs:  Within the past 12 months we worried whether our food would run out before we got money to buy more. Never  Within the past 12 months the food we bought just didn't last and we didn't have money to get more. Never    Last menstrual period is reported as Patient's last menstrual period was 2024 (approximate). JONAH based on LMP is Estimated Date of Delivery: Mar 12, 2025.  Her cycles are regular.  Her last menstrual period was normal.   Since her LMP, she has experienced  nausea, emesis, abdominal pain, fatigue, vaginal discharge, dysuria, pelvic pain, lightheadedness, vaginal bleeding, and constipation.       OBSTETRIC HISTORY:    OB History    Para Term  AB Living   2 1 1 0 0 1   SAB IAB Ectopic Multiple Live Births   0 0 0 0 1      # Outcome Date GA Lbr Familia/2nd Weight Sex Type Anes PTL Lv   2 Current            1 Term 22 39w2d / 00:10 2.73 kg (6 lb 0.3 oz) F Vag-Spont   OSEI      Name: DARCIE,BG      Apgar1: 2  Apgar5: 6       Age of first pregnancy: 19  Previous OB Provider: Dr. Hernandez  Previous Delivering Clinic: [facility]   Release of Records: In care everywhere    Current delivery plan: Unknown at this time   Preferred OB Provider: Jimena Martines MD   Current Primary Care Provider: Doesn't have one   Pediatrician: Unknown at this time     Additional History: Prior pregnancy she had IUGR and iron transfusions done every two weeks.         Have you travelled during the pregnancy?No  Have your sexual partner(s) travelled during the pregnancy?No      HISTORY:   Planned Pregnancy: No- Welcomed   Marital Status:   Occupation: House Keeping   Living in Household: Spouse and Children and Other:  Friends    Father of the baby is  involved.   Family and father of baby is supportive of current pregnancy.  Past Medical History of Father of Baby:No significant medical history    Past History:  Her past medical history History reviewed. No pertinent past medical history..      Her past surgical history: History reviewed. No pertinent surgical history.    She has a history of   IUGR and iron transfusions done every two weeks.        Since her last LMP she denies use of alcohol, tobacco and street drugs.    Pap smear history: Never had one    STD/STI history: No STD history    STD/STI symptoms: no noticeable symptoms     Past medical, surgical, social and family history were reviewed and updated in EPIC.    Medications reviewed by this nurse. Current medication list:  Current Outpatient Medications   Medication Sig Dispense Refill    ondansetron (ZOFRAN ODT) 4 MG ODT tab Take 1 tablet (4 mg) by mouth every 8 hours as needed for nausea 10 tablet 0    albuterol (PROAIR HFA/PROVENTIL HFA/VENTOLIN HFA) 108 (90 Base) MCG/ACT inhaler Inhale 2 puffs into the lungs (Patient not taking: Reported on 2024)       The following medications were recommended to be discontinued due to Pregnancy Category D status: None   Patient informed to contact her primary care provider as soon as possible to discuss a safer alternative.    Risk factors:  Moderate and moderately severe risks (consult with OB/Gyn)  Previous fetal or  demise: No  History of  delivery: No  History of heart disease Class I: No  Severe anemia, unresponsive to iron therapy: Yes  Pelvic mass or neoplasm: No  Previous : No  Hyper/hypothyroidism: No  History of postpartum hemorrhage  requiring transfusion:No  History of Placenta Accreta: No    High Risk (Pregnancy managed by OB/Gyn)  Multiple pregnancy: No  Pre-gestational diabetes: No  Chronic Hypertension: No  Renal Failure: No  Heart disease, class II or greater: No  Rh Isoimmunization: No  Chronic active hepatitis: No  Convulsive disorder, poorly controlled: No  Isoimmune thrombocytopenia: No  Pre-term premature rupture of membranes: No  Lupus or other autoimmune disorder: No  Human Immunodeficiency Virus: No      ASSESSMENT/PLAN:       ICD-10-CM    1. Pregnancy test positive  Z32.01       2. First trimester pregnancy  Z34.91           23 year old , 9w0d of pregnancy with JONAH of 3/12/2025, by Last Menstrual Period    Per standing orders and scope of practice of this nurse, patient will have the following orders placed and completed prior to initial OB visit with the appropriate provider:    --early ultrasound for dating and viability ordered for 6+ weeks gestation based on LMP    --Quantitative Beta HCG and progesterone monitoring if indicated    Counseling given:     - Recommended weight gain for pregnancy: 25-35 lbs.   BMI < 18.5  28-40 lbs   18.5 - 24.9 25-35   25 - 29.9 15-25   > 30  < 15       PLAN/PATIENT INSTRUCTIONS:    Normal exercise.  Normal sexual activity.  Prenatal vitamins.  Anticipated weight gain.    follow-up appointment with Dr. Jimena Martines  and MIGDALIA Mclain CNP for pre-aram care and take multivitamin or pre-aram vitamins    Kaitlin Valenzuela RN.................................................. 2024 2:21 PM

## 2024-08-10 PROCEDURE — 99284 EMERGENCY DEPT VISIT MOD MDM: CPT | Performed by: FAMILY MEDICINE

## 2024-08-11 ENCOUNTER — HOSPITAL ENCOUNTER (EMERGENCY)
Facility: OTHER | Age: 24
Discharge: HOME | End: 2024-08-11
Attending: FAMILY MEDICINE | Admitting: FAMILY MEDICINE

## 2024-08-11 VITALS
RESPIRATION RATE: 18 BRPM | WEIGHT: 120 LBS | BODY MASS INDEX: 21.26 KG/M2 | SYSTOLIC BLOOD PRESSURE: 125 MMHG | DIASTOLIC BLOOD PRESSURE: 82 MMHG | TEMPERATURE: 98.5 F | HEIGHT: 63 IN | HEART RATE: 61 BPM | OXYGEN SATURATION: 100 %

## 2024-08-11 DIAGNOSIS — K21.9 GASTROESOPHAGEAL REFLUX DISEASE, UNSPECIFIED WHETHER ESOPHAGITIS PRESENT: ICD-10-CM

## 2024-08-11 DIAGNOSIS — Z3A.09 9 WEEKS GESTATION OF PREGNANCY: ICD-10-CM

## 2024-08-11 DIAGNOSIS — R11.2 NAUSEA AND VOMITING, UNSPECIFIED VOMITING TYPE: ICD-10-CM

## 2024-08-11 LAB
ALBUMIN UR-MCNC: 10 MG/DL
AMORPH CRY #/AREA URNS HPF: ABNORMAL /HPF
APPEARANCE UR: ABNORMAL
BACTERIA #/AREA URNS HPF: ABNORMAL /HPF
BACTERIAL VAGINOSIS VAG-IMP: NEGATIVE
BASOPHILS # BLD AUTO: 0 10E3/UL (ref 0–0.2)
BASOPHILS NFR BLD AUTO: 0 %
BILIRUB UR QL STRIP: NEGATIVE
CANDIDA DNA VAG QL NAA+PROBE: NOT DETECTED
CANDIDA GLABRATA / CANDIDA KRUSEI DNA: NOT DETECTED
COLOR UR AUTO: ABNORMAL
EOSINOPHIL # BLD AUTO: 0.2 10E3/UL (ref 0–0.7)
EOSINOPHIL NFR BLD AUTO: 2 %
ERYTHROCYTE [DISTWIDTH] IN BLOOD BY AUTOMATED COUNT: 14.3 % (ref 10–15)
GLUCOSE UR STRIP-MCNC: NEGATIVE MG/DL
HCT VFR BLD AUTO: 34.4 % (ref 35–47)
HGB BLD-MCNC: 11.3 G/DL (ref 11.7–15.7)
HGB UR QL STRIP: NEGATIVE
HOLD SPECIMEN: NORMAL
IMM GRANULOCYTES # BLD: 0 10E3/UL
IMM GRANULOCYTES NFR BLD: 0 %
KETONES UR STRIP-MCNC: NEGATIVE MG/DL
LEUKOCYTE ESTERASE UR QL STRIP: ABNORMAL
LYMPHOCYTES # BLD AUTO: 2.6 10E3/UL (ref 0.8–5.3)
LYMPHOCYTES NFR BLD AUTO: 27 %
MCH RBC QN AUTO: 29.9 PG (ref 26.5–33)
MCHC RBC AUTO-ENTMCNC: 32.8 G/DL (ref 31.5–36.5)
MCV RBC AUTO: 91 FL (ref 78–100)
MONOCYTES # BLD AUTO: 0.8 10E3/UL (ref 0–1.3)
MONOCYTES NFR BLD AUTO: 8 %
MUCOUS THREADS #/AREA URNS LPF: PRESENT /LPF
NEUTROPHILS # BLD AUTO: 6 10E3/UL (ref 1.6–8.3)
NEUTROPHILS NFR BLD AUTO: 62 %
NITRATE UR QL: NEGATIVE
NRBC # BLD AUTO: 0 10E3/UL
NRBC BLD AUTO-RTO: 0 /100
PH UR STRIP: 6.5 [PH] (ref 5–9)
PLATELET # BLD AUTO: 264 10E3/UL (ref 150–450)
RBC # BLD AUTO: 3.78 10E6/UL (ref 3.8–5.2)
RBC URINE: 2 /HPF
SP GR UR STRIP: 1.03 (ref 1–1.03)
SQUAMOUS EPITHELIAL: 3 /HPF
T VAGINALIS DNA VAG QL NAA+PROBE: NOT DETECTED
UROBILINOGEN UR STRIP-MCNC: NORMAL MG/DL
WBC # BLD AUTO: 9.7 10E3/UL (ref 4–11)
WBC URINE: 15 /HPF

## 2024-08-11 PROCEDURE — 250N000011 HC RX IP 250 OP 636: Performed by: FAMILY MEDICINE

## 2024-08-11 PROCEDURE — 0352U MULTIPLEX VAGINAL PANEL BY PCR: CPT | Performed by: FAMILY MEDICINE

## 2024-08-11 PROCEDURE — 250N000013 HC RX MED GY IP 250 OP 250 PS 637: Performed by: FAMILY MEDICINE

## 2024-08-11 PROCEDURE — 81003 URINALYSIS AUTO W/O SCOPE: CPT | Performed by: FAMILY MEDICINE

## 2024-08-11 PROCEDURE — 87086 URINE CULTURE/COLONY COUNT: CPT | Performed by: FAMILY MEDICINE

## 2024-08-11 PROCEDURE — 36415 COLL VENOUS BLD VENIPUNCTURE: CPT | Performed by: FAMILY MEDICINE

## 2024-08-11 PROCEDURE — 85025 COMPLETE CBC W/AUTO DIFF WBC: CPT | Performed by: FAMILY MEDICINE

## 2024-08-11 RX ORDER — FAMOTIDINE 20 MG/1
20 TABLET, FILM COATED ORAL ONCE
Status: COMPLETED | OUTPATIENT
Start: 2024-08-11 | End: 2024-08-11

## 2024-08-11 RX ORDER — ACETAMINOPHEN 500 MG
1000 TABLET ORAL ONCE
Status: COMPLETED | OUTPATIENT
Start: 2024-08-11 | End: 2024-08-11

## 2024-08-11 RX ORDER — ONDANSETRON 4 MG/1
4 TABLET, ORALLY DISINTEGRATING ORAL EVERY 6 HOURS PRN
Qty: 10 TABLET | Refills: 0 | Status: SHIPPED | OUTPATIENT
Start: 2024-08-11

## 2024-08-11 RX ORDER — ONDANSETRON 4 MG/1
4 TABLET, ORALLY DISINTEGRATING ORAL ONCE
Status: COMPLETED | OUTPATIENT
Start: 2024-08-11 | End: 2024-08-11

## 2024-08-11 RX ORDER — FAMOTIDINE 20 MG/1
20 TABLET, FILM COATED ORAL 2 TIMES DAILY
Qty: 40 TABLET | Refills: 0 | Status: SHIPPED | OUTPATIENT
Start: 2024-08-11 | End: 2024-08-31

## 2024-08-11 RX ADMIN — FAMOTIDINE 20 MG: 20 TABLET, FILM COATED ORAL at 00:35

## 2024-08-11 RX ADMIN — ACETAMINOPHEN 1000 MG: 500 TABLET, FILM COATED ORAL at 00:35

## 2024-08-11 RX ADMIN — ONDANSETRON 4 MG: 4 TABLET, ORALLY DISINTEGRATING ORAL at 00:35

## 2024-08-11 ASSESSMENT — COLUMBIA-SUICIDE SEVERITY RATING SCALE - C-SSRS
1. IN THE PAST MONTH, HAVE YOU WISHED YOU WERE DEAD OR WISHED YOU COULD GO TO SLEEP AND NOT WAKE UP?: NO
6. HAVE YOU EVER DONE ANYTHING, STARTED TO DO ANYTHING, OR PREPARED TO DO ANYTHING TO END YOUR LIFE?: NO
2. HAVE YOU ACTUALLY HAD ANY THOUGHTS OF KILLING YOURSELF IN THE PAST MONTH?: NO

## 2024-08-11 ASSESSMENT — ACTIVITIES OF DAILY LIVING (ADL)
ADLS_ACUITY_SCORE: 35
ADLS_ACUITY_SCORE: 35

## 2024-08-11 ASSESSMENT — ENCOUNTER SYMPTOMS
NAUSEA: 1
VOMITING: 1
DYSURIA: 1
ABDOMINAL PAIN: 1

## 2024-08-11 NOTE — ED TRIAGE NOTES
Patient arrived POV with significant other, per patient she is concerned that she has a UTI that is causing burning with urination, bilateral flank pain, LLQ abdominal pain, N/V. States she has had sx for approximately 2 weeks. Patient is 9 weeks pregnant. Alert, ambulatory.      Triage Assessment (Adult)       Row Name 08/11/24 0003          Triage Assessment    Airway WDL WDL     Additional Documentation Breath Sounds (Group)        Respiratory WDL    Respiratory WDL WDL        Breath Sounds    Breath Sounds All Fields     All Lung Fields Breath Sounds Anterior:;equal bilaterally        Skin Circulation/Temperature WDL    Skin Circulation/Temperature WDL WDL        Cardiac WDL    Cardiac WDL WDL        Peripheral/Neurovascular WDL    Peripheral Neurovascular WDL WDL        Cognitive/Neuro/Behavioral WDL    Cognitive/Neuro/Behavioral WDL WDL

## 2024-08-11 NOTE — ED PROVIDER NOTES
History     Chief Complaint   Patient presents with    Rule out Urinary Tract Infection     The history is provided by the patient, the spouse and medical records.     Carol Patterson is a 23 year old  with a 9 week gestation pregnancy here with a couple concerns:  - she has dysuria and burning with urination. She wonders if she has a UTI.  - she has been tired and has a history of anemia with her previous pregnancy.  - she has N/V/abdominal pain and it is worse if she does not eat something- she has decreased appetite- she has been trying the BRAT diet but it is not helping  - she has had greenish vaginal discharge for about the past 1 1/2 weeks    She just got back together with her  a few months ago and she's not worried about STI.    She has not been seen in clinic yet as they do not have health insurance. She has an appointment with Wendy Greco on .     Allergies:  Allergies   Allergen Reactions    Cats Shortness Of Breath    Dust Mite Extract Shortness Of Breath    Influenza Vaccines Nausea and Vomiting     Groggy/fever  Patient states she does not believe this was an allergic reaction 3/16/21.    Influenza Virus Vaccine        Problem List:    Patient Active Problem List    Diagnosis Date Noted    Nexplanon in place 2021     Priority: Medium     Formatting of this note might be different from the original.   Insert date: 3/11/2021.   Removal due: 3/11/2024.      Anemia during pregnancy in third trimester 2021     Priority: Medium     (normal spontaneous vaginal delivery) 01/15/2021     Priority: Medium     Formatting of this note might be different from the original.  MY BIRTH PLAN   My Due Date: 2021. Scheduled for induction 2021.   My Delivery Doctor/Midwife: Jagruti Brown   My Baby's Doctor will be: Dr. Lomas  Support People Attending My Birth will be: Hardik Evans-FOB   LABOR   I am Rh Negative and need Rhogam: N/A   I am GBS Positive and need  "Antibiotics during my labor: N/A   What I would like my Healthcare Team to know about my Labor and Delivery   My Pregnancy: \"I'd like to move around freely during labor if possible. I'm not planning to bank my baby's cord blood. I'd like Hardik to cut the cord.\"  My Fears and Concerns: \"If my baby has to be taken from me for medical treatment, I'd like my partner to go along.\"  My Family:  Myself: \"I'd like to hold my baby skin to skin immediately after delivery.\"  My Pain Control: \"I'd like to be offered an epidural or other pain medication as soon as possible.\"  My Birthing preferences: \"When it's time to push I'd like to be coached on when to push and for how long. I don't want anyone else but my partner in the delivery room with me.\"  AFTER DELIVERY   My preferences for postpartum and my : Plans to breastfeed. Baby will have a pacifier.   Any other ways we can help you after your delivery:      Poor fetal growth affecting management of mother in third trimester 2021     Priority: Medium     Formatting of this note might be different from the original.   Already getting twice a week  testing with biophysical profile and NST.  Will start twice a week umbilical cord doppler studies as well.  If these remain normal, plan to induce at 39 weeks gestational age.  If umbilical cord doppler study abnormal plan to induce before then.      Marginal insertion of umbilical cord affecting management of mother 2020     Priority: Medium     Formatting of this note might be different from the original.   Diagnosed on 20 week fetal survey. Will need serial growth ultrasounds and consider  testing starting at 32 weeks gestational age      Allergic rhinoconjunctivitis 10/25/2016     Priority: Medium    Shortness of breath 10/25/2016     Priority: Medium    Swelling 10/25/2016     Priority: Medium    Exercise-induced asthma 2016     Priority: Medium     Formatting of this note might be " "different from the original.   Patient thinks she outgrew this.  She no longer uses any kind of inhaler and never has any symptoms of this.      Encopresis 09/02/2011     Priority: Medium     Formatting of this note might be different from the original.  Updated per 10/1/17 IMO import Formatting of this note might be different from the original.  Overview: Updated per 10/1/17 IMO import  Formatting of this note might be different from the original.   Updated per 10/1/17 IMO import  Formatting of this note might be different from the original.   Overview:    Updated per 10/1/17 IMO import          Past Medical History:    History reviewed. No pertinent past medical history.    Past Surgical History:    No past surgical history on file.    Family History:    Family History   Problem Relation Age of Onset    Other - See Comments Brother         von willebrand disease type 1       Social History:  Marital Status:   [2]  Social History     Tobacco Use    Smoking status: Never    Smokeless tobacco: Never   Vaping Use    Vaping status: Never Used   Substance Use Topics    Alcohol use: Not Currently     Comment: 1 beer a night    Drug use: Never        Medications:    famotidine (PEPCID) 20 MG tablet  ondansetron (ZOFRAN ODT) 4 MG ODT tab  albuterol (PROAIR HFA/PROVENTIL HFA/VENTOLIN HFA) 108 (90 Base) MCG/ACT inhaler  ondansetron (ZOFRAN ODT) 4 MG ODT tab      Review of Systems   Gastrointestinal:  Positive for abdominal pain, nausea and vomiting.   Genitourinary:  Positive for dysuria and vaginal discharge.   All other systems reviewed and are negative.      Physical Exam   BP: 125/82  Pulse: 61  Temp: 98.5  F (36.9  C)  Resp: 18  Height: 160 cm (5' 3\")  Weight: 54.4 kg (120 lb)  SpO2: 100 %      Physical Exam  Vitals and nursing note reviewed.   Constitutional:       General: She is not in acute distress.     Appearance: She is ill-appearing. She is not toxic-appearing.   Cardiovascular:      Rate and Rhythm: " Normal rate and regular rhythm.      Pulses: Normal pulses.      Heart sounds: Normal heart sounds.   Pulmonary:      Effort: Pulmonary effort is normal. No respiratory distress.      Breath sounds: Normal breath sounds.   Abdominal:      General: Abdomen is flat. Bowel sounds are normal. There is no distension.      Palpations: Abdomen is soft.      Tenderness: There is abdominal tenderness. There is no guarding or rebound.   Skin:     General: Skin is warm and dry.   Neurological:      General: No focal deficit present.      Mental Status: She is alert and oriented to person, place, and time.         Results for orders placed or performed during the hospital encounter of 08/11/24 (from the past 24 hour(s))   Urine Macroscopic with reflex to Microscopic   Result Value Ref Range    Color Urine Light Yellow Colorless, Straw, Light Yellow, Yellow    Appearance Urine Slightly Cloudy (A) Clear    Glucose Urine Negative Negative mg/dL    Bilirubin Urine Negative Negative    Ketones Urine Negative Negative mg/dL    Specific Gravity Urine 1.027 1.000 - 1.030    Blood Urine Negative Negative    pH Urine 6.5 5.0 - 9.0    Protein Albumin Urine 10 (A) Negative mg/dL    Urobilinogen Urine Normal Normal, 2.0 mg/dL    Nitrite Urine Negative Negative    Leukocyte Esterase Urine Moderate (A) Negative    Bacteria Urine Few (A) None Seen /HPF    RBC Urine 2 <=2 /HPF    WBC Urine 15 (H) <=5 /HPF    Squamous Epithelials Urine 3 (H) <=1 /HPF    Mucus Urine Present (A) None Seen /LPF    Amorphous Crystals Urine Few (A) None Seen /HPF   CBC with platelets differential    Narrative    The following orders were created for panel order CBC with platelets differential.  Procedure                               Abnormality         Status                     ---------                               -----------         ------                     CBC with platelets and d...[[id number]]  Abnormal            Final result                 Please view  results for these tests on the individual orders.   [place] Draw    Narrative    The following orders were created for panel order [place] Draw.  Procedure                               Abnormality         Status                     ---------                               -----------         ------                     Extra Blue Top Tube[[id number]]                              Final result               Extra Red Top Tube[[id number]]                               Final result               Extra Green Top (Lithium...[[id number]]                      Final result               Extra Purple Top Tube[[id number]]                                                       Extra Green Top (Lithium...[[id number]]                      Final result                 Please view results for these tests on the individual orders.   CBC with platelets and differential   Result Value Ref Range    WBC Count 9.7 4.0 - 11.0 10e3/uL    RBC Count 3.78 (L) 3.80 - 5.20 10e6/uL    Hemoglobin 11.3 (L) 11.7 - 15.7 g/dL    Hematocrit 34.4 (L) 35.0 - 47.0 %    MCV 91 78 - 100 fL    MCH 29.9 26.5 - 33.0 pg    MCHC 32.8 31.5 - 36.5 g/dL    RDW 14.3 10.0 - 15.0 %    Platelet Count 264 150 - 450 10e3/uL    % Neutrophils 62 %    % Lymphocytes 27 %    % Monocytes 8 %    % Eosinophils 2 %    % Basophils 0 %    % Immature Granulocytes 0 %    NRBCs per 100 WBC 0 <1 /100    Absolute Neutrophils 6.0 1.6 - 8.3 10e3/uL    Absolute Lymphocytes 2.6 0.8 - 5.3 10e3/uL    Absolute Monocytes 0.8 0.0 - 1.3 10e3/uL    Absolute Eosinophils 0.2 0.0 - 0.7 10e3/uL    Absolute Basophils 0.0 0.0 - 0.2 10e3/uL    Absolute Immature Granulocytes 0.0 <=0.4 10e3/uL    Absolute NRBCs 0.0 10e3/uL   Extra Blue Top Tube   Result Value Ref Range    Hold Specimen JIC    Extra Red Top Tube   Result Value Ref Range    Hold Specimen JIC    Extra Green Top (Lithium Heparin) Tube   Result Value Ref Range    Hold Specimen JIC    Extra Green Top (Lithium Heparin) ON ICE   Result Value Ref Range     Hold Specimen [facility]    Multiplex Vaginal Panel by PCR    Specimen: Vagina; Swab   Result Value Ref Range    Bacterial Vaginosis Organism DNA Negative Negative    Candida Group DNA Not Detected Not Detected    Candida glabrata / Leigh krusei DNA Not Detected Not Detected    Trichomonas vaginalis DNA Not Detected Not Detected    Narrative    The Xpert  Xpress MVP test, performed on the [employer], is an automated, qualitative in vitro diagnostic test for the detection of DNA targets from anaerobic bacteria associated with bacterial vaginosis, Candida species associated with vulvovaginal candidiasis, and Trichomonas vaginalis. The assay uses clinician-collected and self-collected vaginal swabs from patients who are symptomatic for vaginitis/ vaginosis. The Xpert  Xpress MVP test utilizes real-time polymerase chain reaction (PCR) for the amplification of specific DNA targets and utilizes fluorogenic target-specific hybridization probes to detect and differentiate DNA. It is intended to aid in the diagnosis of vaginal infections in women with a clinical presentation consistent with bacterial vaginosis, vulvovaginal candidiasis, or trichomoniasis.   The assay targets three anaerobic microorgansims that are associated with bacterial vaginosis (BV). Other organisms that are not detected by the Xpert  Xpress MVP test have also been reported to be associated with BV. The BV organism and Candida species targets of the Xpert  Xpress MVP test can be commensal in women; positive results must be considered in conjunction with other clinical and patient information to determine the disease status.       Medications   famotidine (PEPCID) tablet 20 mg (20 mg Oral $Given 8/11/24 0035)   ondansetron (ZOFRAN ODT) ODT tab 4 mg (4 mg Oral $Given 8/11/24 0035)   acetaminophen (TYLENOL) tablet 1,000 mg (1,000 mg Oral $Given 8/11/24 0035)       Assessments & Plan (with Medical Decision Making)  Carol Patterson is a  "23 year old  with a 9 week gestation pregnancy here with a couple concerns:  - she has dysuria and burning with urination. She wonders if she has a UTI.  - she has been tired and has a history of anemia with her previous pregnancy.  - she has N/V/abdominal pain and it is worse if she does not eat something- she has decreased appetite- she has been trying the BRAT diet but it is not helping  - she has had greenish vaginal discharge for about the past 1 1/2 weeks  She just got back together with her  a few months ago and she's not worried about STI.  She has not been seen in clinic yet as they do not have health insurance. She has an appointment with Wendy Greco on .   VS in the ED /82   Pulse 61   Temp 98.5  F (36.9  C) (Oral)   Resp 18   Ht 1.6 m (5' 3\")   Wt 54.4 kg (120 lb)   LMP 2024 (Approximate)   SpO2 100%   BMI 21.26 kg/m    FHT in the 150's by hand held Doppler. Heart and lungs sound normal. Abdomen is flat, normal bowel sounds, some tenderness with palpation.  Vaginal exam was declined by the patient.  We gave Tylenol, Pepcid, Zofran ODT  Labs show UA is a dirty catch and does not imply UTI (UC pending), CBC with hgb 11.3 (improved).  Vaginal panel negative.  I will get her home with some printed Rx for Zofran and Pepcid so she can find the cheapest pharmacy.     I have reviewed the nursing notes.    I have reviewed the findings, diagnosis, plan and need for follow up with the patient.  Medical Decision Making  The patient's presentation was of low complexity (2+ clearly self-limited or minor problems).    The patient's evaluation involved:  an assessment requiring an independent historian (see separate area of note for details)  ordering and/or review of 2 test(s) in this encounter (see separate area of note for details)    The patient's management necessitated moderate risk (prescription drug management including medications given in the ED).      New " Prescriptions    FAMOTIDINE (PEPCID) 20 MG TABLET    Take 1 tablet (20 mg) by mouth 2 times daily for 20 days    ONDANSETRON (ZOFRAN ODT) 4 MG ODT TAB    Take 1 tablet (4 mg) by mouth every 6 hours as needed for nausea       Final diagnoses:   9 weeks gestation of pregnancy   Gastroesophageal reflux disease, unspecified whether esophagitis present   Nausea and vomiting, unspecified vomiting type       8/10/2024   [facility] AND [facility], Jewel Mejia MD  08/11/24 2063

## 2024-08-11 NOTE — DISCHARGE INSTRUCTIONS
I recommend you use the Zofran, Tylenol and Pepcid to help control symptoms.    Thank you for choosing our Emergency Department for your care.     You may receive a phone call or letter for a survey about your care in our ED.  Please complete this as this is how we improve care for our patients.     If you have any questions after leaving the ED you can call or text me on my cell phone at [phone].  I am not on call so if I do not answer my phone please leave a message- I will get back to you.  If you are not doing well please return to the ED.     Sincerely,    Dr Juan Carlos Rhoades M.D.  Medical Director  [facility] Emergency Department

## 2024-08-11 NOTE — ED NOTES
Discharge instructions reviewed with patient and significant other. Prescriptions given to patient.

## 2024-08-13 LAB — BACTERIA UR CULT: NO GROWTH

## 2024-08-28 ENCOUNTER — TELEPHONE (OUTPATIENT)
Dept: OBGYN | Facility: OTHER | Age: 24
End: 2024-08-28

## 2024-08-28 NOTE — TELEPHONE ENCOUNTER
Called and spoke to Patient after verifying last name and date of birth. Patient stated that she had forgotten about her appointment. When patient was asked if she would like to reschedule her appointment she responded not at this time. I will call back. Patient stated that she did not need anything at this time.     Kaitlin Valenzuela RN on 8/28/2024 at 9:37 AM

## 2024-09-05 ENCOUNTER — TELEPHONE (OUTPATIENT)
Dept: FAMILY MEDICINE | Facility: OTHER | Age: 24
End: 2024-09-05

## 2024-09-05 NOTE — TELEPHONE ENCOUNTER
After verifying pt last name and date of birth, pt was given below medications.     Safe OTC meds for influenza-like illnesses:    Pain/fever  Call your clinic if your pain is in your abdomen (stomach).   o Tylenol  (acetaminophen) Take 650 to 1,000 mg every four hours as needed. Do not take more than 4,000 mg in 24 hours. If you don't have relief in 24 hours, call your clinic.    cough (alcohol-free syrup)   o Vicks  Nature Fusion  Cough (dextromethorphan hydrobromide)  o Delsym  12-Hour Extended-release Suspension (dextromethorphan polistirex)  o Coricidin  HBP Chest Congestion and Cough or Adult Robitussin  Peak Cold Cough and Chest (dextromethorphan, guaifenesin)  o Mucinex  (guaifenesin)    sinus congestion and cold   o Clor-Trimetron  (chlorpheniramine)  o Coricidin  HBP Chest Congestion and Cough (dextromethorphan, guaifenesin)  o Ocean Mist  nasal spray (saline [sodium chloride] nasal sprays)  o Sudafed  (pseudoephedrine) Avoid in the first trimester.   Avoid all products with phenylpropanolamine and phenylephrine.     sore throat   o Cepacol  Maximum Strength Sore Throat Spray or Sucrets  lozenges (dyclonine hydrochloride)  o Chloraseptic  lozenges (benzocaine) or spray (phenol) Do not take longer than two days.  o Halls  or Robitussin  lozenges (menthol)  o Vicks  lozenges with honey (dextromethorphan hydrobromide)     Nurse attempted to transfer pt to scheduling, no answer. Routing to scheduling to help schedule pt for OB physical and US as no future appointments have been made.     Shanika Nur, RN on 9/5/2024 at 3:08 PM

## 2024-09-05 NOTE — TELEPHONE ENCOUNTER
Patient has a cold and is 3 months pregnant and wants to know what medication she is able to take. Please advise.     Val Schmidt on 9/5/2024 at 2:45 PM

## 2024-09-18 LAB
ABO/RH(D): NORMAL
ANTIBODY SCREEN: NEGATIVE
SPECIMEN EXPIRATION DATE: NORMAL

## 2024-09-19 ENCOUNTER — PRENATAL OFFICE VISIT (OUTPATIENT)
Dept: OBGYN | Facility: OTHER | Age: 24
End: 2024-09-19
Attending: NURSE PRACTITIONER

## 2024-09-19 VITALS
DIASTOLIC BLOOD PRESSURE: 70 MMHG | HEART RATE: 65 BPM | WEIGHT: 115 LBS | SYSTOLIC BLOOD PRESSURE: 118 MMHG | BODY MASS INDEX: 19.16 KG/M2 | HEIGHT: 65 IN

## 2024-09-19 DIAGNOSIS — L70.9 ACNE, UNSPECIFIED ACNE TYPE: ICD-10-CM

## 2024-09-19 DIAGNOSIS — Z87.59 HISTORY OF PRIOR PREGNANCY WITH IUGR NEWBORN: ICD-10-CM

## 2024-09-19 DIAGNOSIS — F41.9 ANXIETY: ICD-10-CM

## 2024-09-19 DIAGNOSIS — R10.9 ABDOMINAL CRAMPING AFFECTING PREGNANCY: ICD-10-CM

## 2024-09-19 DIAGNOSIS — N30.01 ACUTE CYSTITIS WITH HEMATURIA: ICD-10-CM

## 2024-09-19 DIAGNOSIS — R00.2 PALPITATIONS: ICD-10-CM

## 2024-09-19 DIAGNOSIS — Z34.92 ENCOUNTER FOR PREGNANCY RELATED EXAMINATION IN SECOND TRIMESTER: Primary | ICD-10-CM

## 2024-09-19 DIAGNOSIS — O26.899 ABDOMINAL CRAMPING AFFECTING PREGNANCY: ICD-10-CM

## 2024-09-19 DIAGNOSIS — S03.00XA JAW DISLOCATION, INITIAL ENCOUNTER: ICD-10-CM

## 2024-09-19 PROBLEM — O36.5930 POOR FETAL GROWTH AFFECTING MANAGEMENT OF MOTHER IN THIRD TRIMESTER: Status: RESOLVED | Noted: 2021-01-04 | Resolved: 2024-09-19

## 2024-09-19 PROBLEM — O43.199 MARGINAL INSERTION OF UMBILICAL CORD AFFECTING MANAGEMENT OF MOTHER: Status: RESOLVED | Noted: 2020-09-24 | Resolved: 2024-09-19

## 2024-09-19 PROBLEM — Z97.5 NEXPLANON IN PLACE: Status: RESOLVED | Noted: 2021-03-11 | Resolved: 2024-09-19

## 2024-09-19 LAB
ALBUMIN UR-MCNC: 10 MG/DL
APPEARANCE UR: ABNORMAL
BACTERIA #/AREA URNS HPF: ABNORMAL /HPF
BACTERIAL VAGINOSIS VAG-IMP: NEGATIVE
BASOPHILS # BLD AUTO: 0 10E3/UL (ref 0–0.2)
BASOPHILS NFR BLD AUTO: 0 %
BILIRUB UR QL STRIP: NEGATIVE
C TRACH DNA SPEC QL PROBE+SIG AMP: NEGATIVE
CANDIDA DNA VAG QL NAA+PROBE: NOT DETECTED
CANDIDA GLABRATA / CANDIDA KRUSEI DNA: NOT DETECTED
COLOR UR AUTO: ABNORMAL
EOSINOPHIL # BLD AUTO: 0.1 10E3/UL (ref 0–0.7)
EOSINOPHIL NFR BLD AUTO: 1 %
ERYTHROCYTE [DISTWIDTH] IN BLOOD BY AUTOMATED COUNT: 14.2 % (ref 10–15)
GLUCOSE UR STRIP-MCNC: NEGATIVE MG/DL
HCT VFR BLD AUTO: 33.5 % (ref 35–47)
HGB BLD-MCNC: 10.7 G/DL (ref 11.7–15.7)
HGB UR QL STRIP: NEGATIVE
IMM GRANULOCYTES # BLD: 0.1 10E3/UL
IMM GRANULOCYTES NFR BLD: 1 %
KETONES UR STRIP-MCNC: NEGATIVE MG/DL
LEUKOCYTE ESTERASE UR QL STRIP: ABNORMAL
LYMPHOCYTES # BLD AUTO: 2.2 10E3/UL (ref 0.8–5.3)
LYMPHOCYTES NFR BLD AUTO: 18 %
MCH RBC QN AUTO: 30 PG (ref 26.5–33)
MCHC RBC AUTO-ENTMCNC: 31.9 G/DL (ref 31.5–36.5)
MCV RBC AUTO: 94 FL (ref 78–100)
MONOCYTES # BLD AUTO: 0.7 10E3/UL (ref 0–1.3)
MONOCYTES NFR BLD AUTO: 6 %
MUCOUS THREADS #/AREA URNS LPF: PRESENT /LPF
N GONORRHOEA DNA SPEC QL NAA+PROBE: NEGATIVE
NEUTROPHILS # BLD AUTO: 8.8 10E3/UL (ref 1.6–8.3)
NEUTROPHILS NFR BLD AUTO: 74 %
NITRATE UR QL: NEGATIVE
NRBC # BLD AUTO: 0 10E3/UL
NRBC BLD AUTO-RTO: 0 /100
PH UR STRIP: 7 [PH] (ref 5–9)
PLATELET # BLD AUTO: 356 10E3/UL (ref 150–450)
RBC # BLD AUTO: 3.57 10E6/UL (ref 3.8–5.2)
RBC URINE: 3 /HPF
SP GR UR STRIP: 1.03 (ref 1–1.03)
SQUAMOUS EPITHELIAL: 3 /HPF
T VAGINALIS DNA VAG QL NAA+PROBE: NOT DETECTED
TSH SERPL DL<=0.005 MIU/L-ACNC: 1.81 UIU/ML (ref 0.3–4.2)
UROBILINOGEN UR STRIP-MCNC: NORMAL MG/DL
WBC # BLD AUTO: 11.9 10E3/UL (ref 4–11)
WBC URINE: 71 /HPF
YEAST #/AREA URNS HPF: ABNORMAL /HPF

## 2024-09-19 PROCEDURE — 86803 HEPATITIS C AB TEST: CPT | Mod: ZL | Performed by: NURSE PRACTITIONER

## 2024-09-19 PROCEDURE — 87389 HIV-1 AG W/HIV-1&-2 AB AG IA: CPT | Mod: ZL | Performed by: NURSE PRACTITIONER

## 2024-09-19 PROCEDURE — 87086 URINE CULTURE/COLONY COUNT: CPT | Mod: ZL | Performed by: NURSE PRACTITIONER

## 2024-09-19 PROCEDURE — 86900 BLOOD TYPING SEROLOGIC ABO: CPT | Performed by: NURSE PRACTITIONER

## 2024-09-19 PROCEDURE — 36415 COLL VENOUS BLD VENIPUNCTURE: CPT | Mod: ZL | Performed by: NURSE PRACTITIONER

## 2024-09-19 PROCEDURE — 85049 AUTOMATED PLATELET COUNT: CPT | Mod: ZL | Performed by: NURSE PRACTITIONER

## 2024-09-19 PROCEDURE — 87340 HEPATITIS B SURFACE AG IA: CPT | Mod: ZL | Performed by: NURSE PRACTITIONER

## 2024-09-19 PROCEDURE — 99204 OFFICE O/P NEW MOD 45 MIN: CPT | Performed by: NURSE PRACTITIONER

## 2024-09-19 PROCEDURE — 84443 ASSAY THYROID STIM HORMONE: CPT | Mod: ZL | Performed by: NURSE PRACTITIONER

## 2024-09-19 PROCEDURE — 81001 URINALYSIS AUTO W/SCOPE: CPT | Mod: ZL | Performed by: NURSE PRACTITIONER

## 2024-09-19 PROCEDURE — 99207 PR OB VISIT-NO CHARGE - GICH ONLY: CPT | Performed by: NURSE PRACTITIONER

## 2024-09-19 PROCEDURE — 86762 RUBELLA ANTIBODY: CPT | Mod: ZL | Performed by: NURSE PRACTITIONER

## 2024-09-19 PROCEDURE — 86780 TREPONEMA PALLIDUM: CPT | Mod: ZL | Performed by: NURSE PRACTITIONER

## 2024-09-19 PROCEDURE — 0352U MULTIPLEX VAGINAL PANEL BY PCR: CPT | Mod: ZL | Performed by: NURSE PRACTITIONER

## 2024-09-19 PROCEDURE — 87491 CHLMYD TRACH DNA AMP PROBE: CPT | Mod: ZL | Performed by: NURSE PRACTITIONER

## 2024-09-19 RX ORDER — ASPIRIN 81 MG/1
81 TABLET ORAL DAILY
Qty: 90 TABLET | Refills: 3 | Status: SHIPPED | OUTPATIENT
Start: 2024-09-19

## 2024-09-19 RX ORDER — CEPHALEXIN 500 MG/1
500 CAPSULE ORAL 2 TIMES DAILY
Qty: 14 CAPSULE | Refills: 0 | Status: SHIPPED | OUTPATIENT
Start: 2024-09-19 | End: 2024-09-26

## 2024-09-19 RX ORDER — PRENATAL VIT/IRON FUM/FOLIC AC 27MG-0.8MG
1 TABLET ORAL DAILY
Qty: 90 TABLET | Refills: 3 | Status: SHIPPED | OUTPATIENT
Start: 2024-09-19

## 2024-09-19 RX ORDER — VITAMIN A, VITAMIN C, VITAMIN D-3, VITAMIN E, VITAMIN B-1, VITAMIN B-2, NIACIN, VITAMIN B-6, CALCIUM, IRON, ZINC, COPPER 4000; 120; 400; 22; 1.84; 3; 20; 10; 1; 12; 200; 27; 25; 2 [IU]/1; MG/1; [IU]/1; MG/1; MG/1; MG/1; MG/1; MG/1; MG/1; UG/1; MG/1; MG/1; MG/1; MG/1
TABLET ORAL DAILY
COMMUNITY

## 2024-09-19 ASSESSMENT — PAIN SCALES - GENERAL: PAINLEVEL: NO PAIN (0)

## 2024-09-19 NOTE — NURSING NOTE
"Chief Complaint   Patient presents with    Prenatal Care     Patient is here for with only concern is that she is having jaw pain.   Initial /70   Pulse 65   Ht 1.645 m (5' 4.75\")   Wt 52.2 kg (115 lb)   LMP 06/05/2024 (Approximate)   BMI 19.29 kg/m   Estimated body mass index is 19.29 kg/m  as calculated from the following:    Height as of this encounter: 1.645 m (5' 4.75\").    Weight as of this encounter: 52.2 kg (115 lb).  Medication Reconciliation: complete      Aurora Chowdary LPN    "
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
## 2024-09-20 LAB
HBV SURFACE AG SERPL QL IA: NONREACTIVE
HCV AB SERPL QL IA: NONREACTIVE
HIV 1+2 AB+HIV1 P24 AG SERPL QL IA: NONREACTIVE
RUBV IGG SERPL QL IA: 4.75 INDEX
RUBV IGG SERPL QL IA: POSITIVE
T PALLIDUM AB SER QL: NONREACTIVE

## 2024-09-21 LAB — BACTERIA UR CULT: NO GROWTH

## 2024-09-24 ENCOUNTER — PATIENT OUTREACH (OUTPATIENT)
Dept: CARE COORDINATION | Facility: CLINIC | Age: 24
End: 2024-09-24

## 2024-09-24 NOTE — PROGRESS NOTES
Clinic Care Coordination Contact    Received referral from MIGDALIA Pennington/MICKI for Care Coordination to offer to assist with Financial Support, Patient/Caregiver Support; Insurance; and other Resources for support.     Writer initiated a call to patient to discuss her current status and needs.  Newly pregnant.  Living with her  and young child.  Working full time and she and her  share day care responsibilities with their child.     Currently waiting for her application to be accepted for [facility].  Had submitted it a couple weeks ago and is waiting. Will assist with any barriers and concerns re: this with /Joy Mcginnis here at [facility].    Discussed current mental health status.  Patient states she is doing well at this time.  Writer shared about [facility] behavioral health services/resources available and encouraged her to consider if needed.  Will assist her in referrals as needed.     Patient expressed appreciation for info on [employer] and agreed to consider to use their resources for car seat. She will be signing up for [facility] services again.     Patient offered text option--as well as phone--communication.  Texted patient contact info.       Plan:  Enrolled in ongoing Care Coordination to assess and offer assist with community resources/referrals; ongoing encouragement, support as needed.     HERMILA Rivas on 9/25/2024 at 8:35 AM

## 2024-10-06 ENCOUNTER — HEALTH MAINTENANCE LETTER (OUTPATIENT)
Age: 24
End: 2024-10-06

## 2024-10-14 ENCOUNTER — NURSE TRIAGE (OUTPATIENT)
Dept: OBGYN | Facility: OTHER | Age: 24
End: 2024-10-14
Payer: MEDICAID

## 2024-10-14 ENCOUNTER — NURSE TRIAGE (OUTPATIENT)
Dept: NURSING | Facility: CLINIC | Age: 24
End: 2024-10-14
Payer: MEDICAID

## 2024-10-14 NOTE — CONFIDENTIAL NOTE
"OB Triage Call    Is patient's OB/Midwife with the [facility] or [facility]? [facility]- Proceed with triage     Reason for call: Call from patient who says she is 4 months pregnant; been having abdominal pain for past 1-2 weeks; lower hip (mainly her right), very sharp pain, like something is being pressed into her; and ribs. She says she was diagnosed with a UTI and finsihed the medication but feels the UTI is still present. She reports pain while urinary, massive headache, increase vaginal discharge - whitish, clear, watery and sometimes yellow,   Feels lightheaded, pain level 6/10, patient is not sure if she feels feverish or not    Assessment: evaluated for mod-severe pain; r/o UTI    Plan: go to ED for evaluation    Patient plans to deliver at [facility]    Patient's primary OB Provider is [facility] midwives.      Per protocol recommendations needs to be evaluated in the ED      Is patient's delivering hospital on divert? Does not apply due to needs to be evaluated in ED; less than 20 weeks gestation      19w3d    Estimated Date of Delivery: Mar 7, 2025        OB History    Para Term  AB Living   2 1 1 0 0 1   SAB IAB Ectopic Multiple Live Births   0 0 0 0 1      # Outcome Date GA Lbr Familia/2nd Weight Sex Type Anes PTL Lv   2 Current            1 Term 21 39w2d / 00:10 2.73 kg (6 lb 0.3 oz) F Vag-Spont   OSEI      Name: BG DARCIE      Apgar1: 2  Apgar5: 6       No results found for: \"GBS\"       Raza Tirado RN     "

## 2024-10-14 NOTE — TELEPHONE ENCOUNTER
S-(situation): Patient called to report medical symptoms that she has been having and wondering if she should be seen.     B-(background): Patient was triaged this AM at 12:25 am. Patient is currently 19w3d.      A-(assessment): Patient reports that she has clear watery discharge. Abdominal pain, difficulty breathing, and sharp pains. Patient denies any bleeding.      R-(recommendations): Patient was advised to be seen in the ED for evaluation. Patient stated that she would find a ride and come into the ED.     Kaitlin Valenzuela RN on 10/14/2024 at 3:52 PM      Reason for Disposition   MODERATE-SEVERE abdominal pain    Additional Information   Negative: Passed out (i.e., fainted, collapsed and was not responding)   Negative: Shock suspected (e.g., cold/pale/clammy skin, too weak to stand, low BP, rapid pulse)   Negative: Difficult to awaken or acting confused (e.g., disoriented, slurred speech)   Negative: Sounds like a life-threatening emergency to the triager   Negative: Abdominal pain and pregnant 20 or more weeks    Protocols used: Pregnancy - Abdominal Pain Less Than 20 Weeks EGA-A-[place]

## 2024-10-14 NOTE — TELEPHONE ENCOUNTER
Reason for Disposition    MODERATE-SEVERE abdominal pain (e.g., interferes with normal activities, awakens from sleep)    Additional Information    Negative: Passed out (i.e., lost consciousness, collapsed and was not responding)    Negative: Shock suspected (e.g., cold/pale/clammy skin, too weak to stand, low BP, rapid pulse)    Negative: Difficult to awaken or acting confused (e.g., disoriented, slurred speech)    Negative: Sounds like a life-threatening emergency to the triager    Negative: Followed an abdomen (stomach) injury    Negative: [1] Abdominal pain AND [2] pregnant 20 or more weeks    Protocols used: Pregnancy - Abdominal Pain Less Than 20 Weeks EGA-A-

## 2024-10-15 ENCOUNTER — PRENATAL OFFICE VISIT (OUTPATIENT)
Dept: OBGYN | Facility: OTHER | Age: 24
End: 2024-10-15
Attending: OBSTETRICS & GYNECOLOGY
Payer: MEDICAID

## 2024-10-15 ENCOUNTER — HOSPITAL ENCOUNTER (OUTPATIENT)
Dept: ULTRASOUND IMAGING | Facility: OTHER | Age: 24
Discharge: HOME | End: 2024-10-15
Attending: NURSE PRACTITIONER
Payer: MEDICAID

## 2024-10-15 ENCOUNTER — PATIENT OUTREACH (OUTPATIENT)
Dept: CARE COORDINATION | Facility: CLINIC | Age: 24
End: 2024-10-15

## 2024-10-15 VITALS
WEIGHT: 116 LBS | DIASTOLIC BLOOD PRESSURE: 60 MMHG | BODY MASS INDEX: 19.45 KG/M2 | HEART RATE: 72 BPM | SYSTOLIC BLOOD PRESSURE: 108 MMHG

## 2024-10-15 DIAGNOSIS — N30.01 ACUTE CYSTITIS WITH HEMATURIA: ICD-10-CM

## 2024-10-15 DIAGNOSIS — Z34.92 ENCOUNTER FOR PREGNANCY RELATED EXAMINATION IN SECOND TRIMESTER: ICD-10-CM

## 2024-10-15 DIAGNOSIS — Z34.92 ENCOUNTER FOR PREGNANCY RELATED EXAMINATION IN SECOND TRIMESTER: Primary | ICD-10-CM

## 2024-10-15 DIAGNOSIS — K21.9 GASTROESOPHAGEAL REFLUX DISEASE WITHOUT ESOPHAGITIS: ICD-10-CM

## 2024-10-15 DIAGNOSIS — F41.9 ANXIETY: ICD-10-CM

## 2024-10-15 DIAGNOSIS — N89.8 VAGINAL DISCHARGE: ICD-10-CM

## 2024-10-15 LAB
ALBUMIN UR-MCNC: NEGATIVE MG/DL
APPEARANCE UR: CLEAR
BACTERIA #/AREA URNS HPF: ABNORMAL /HPF
BACTERIAL VAGINOSIS VAG-IMP: NEGATIVE
BILIRUB UR QL STRIP: NEGATIVE
CANDIDA DNA VAG QL NAA+PROBE: NOT DETECTED
CANDIDA GLABRATA / CANDIDA KRUSEI DNA: NOT DETECTED
COLOR UR AUTO: ABNORMAL
GLUCOSE UR STRIP-MCNC: NEGATIVE MG/DL
HGB UR QL STRIP: NEGATIVE
KETONES UR STRIP-MCNC: NEGATIVE MG/DL
LEUKOCYTE ESTERASE UR QL STRIP: ABNORMAL
MUCOUS THREADS #/AREA URNS LPF: PRESENT /LPF
NITRATE UR QL: NEGATIVE
PH UR STRIP: 6.5 [PH] (ref 5–9)
RBC URINE: 1 /HPF
SP GR UR STRIP: 1.01 (ref 1–1.03)
SQUAMOUS EPITHELIAL: 2 /HPF
T VAGINALIS DNA VAG QL NAA+PROBE: NOT DETECTED
UROBILINOGEN UR STRIP-MCNC: NORMAL MG/DL
WBC URINE: 2 /HPF

## 2024-10-15 PROCEDURE — 0352U MULTIPLEX VAGINAL PANEL BY PCR: CPT | Mod: ZL | Performed by: OBSTETRICS & GYNECOLOGY

## 2024-10-15 PROCEDURE — 99207 PR OB VISIT-NO CHARGE - GICH ONLY: CPT | Performed by: OBSTETRICS & GYNECOLOGY

## 2024-10-15 PROCEDURE — 76805 OB US >/= 14 WKS SNGL FETUS: CPT

## 2024-10-15 PROCEDURE — 81001 URINALYSIS AUTO W/SCOPE: CPT | Mod: ZL | Performed by: OBSTETRICS & GYNECOLOGY

## 2024-10-15 RX ORDER — HYDROXYZINE HYDROCHLORIDE 10 MG/1
10 TABLET, FILM COATED ORAL 3 TIMES DAILY PRN
Qty: 30 TABLET | Refills: 1 | Status: SHIPPED | OUTPATIENT
Start: 2024-10-15

## 2024-10-15 RX ORDER — FAMOTIDINE 20 MG/1
20 TABLET, FILM COATED ORAL 2 TIMES DAILY
Qty: 180 TABLET | Refills: 2 | Status: SHIPPED | OUTPATIENT
Start: 2024-10-15

## 2024-10-15 ASSESSMENT — PAIN SCALES - GENERAL: PAINLEVEL: NO PAIN (0)

## 2024-10-15 NOTE — PROGRESS NOTES
Clinic Care Coordination Contact  Received request from MD to meet with patient face to face at clinic today.    Discussed her current status briefly from initial phone conversation on 9/24/24.  Patient said her insurance isn't resolved yet.  Took her to  office on way out of clinic but /Joy was not available at the time.     Patient agreed to have Joy/LILY call her tomorrow. She is not sure if her 's policy will cover everything or not.     No other concerns noted today.  Writer did bring her flyers of resources in the area.     Plan:  Ongoing Care Coordination to assess and offer assist with community resources/referrals; ongoing encouragement, support as needed.   HERMILA Rivas on 10/15/2024 at 5:29 PM

## 2024-10-15 NOTE — NURSING NOTE
Chief Complaint   Patient presents with    Prenatal Care     19w4d       Medication Reconciliation: complete    Pt presents to clinic today for prenatal care having Abdominal and Side pain. She is also still having burning with urination and discharge.  Pt denies any bleeding. States baby is moving good. Patient denies contractions.       Azra Mcginnis LPN........................10/15/2024  12:48 PM
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
## 2024-11-14 ENCOUNTER — HOSPITAL ENCOUNTER (OUTPATIENT)
Facility: OTHER | Age: 24
End: 2024-11-14
Admitting: OBSTETRICS & GYNECOLOGY
Payer: MEDICAID

## 2024-11-14 ENCOUNTER — HOSPITAL ENCOUNTER (OUTPATIENT)
Facility: OTHER | Age: 24
Discharge: HOME | End: 2024-11-14
Attending: OBSTETRICS & GYNECOLOGY | Admitting: OBSTETRICS & GYNECOLOGY
Payer: MEDICAID

## 2024-11-14 VITALS
BODY MASS INDEX: 21.79 KG/M2 | TEMPERATURE: 98.4 F | HEIGHT: 63 IN | SYSTOLIC BLOOD PRESSURE: 120 MMHG | DIASTOLIC BLOOD PRESSURE: 56 MMHG | RESPIRATION RATE: 16 BRPM | WEIGHT: 123 LBS | OXYGEN SATURATION: 100 % | HEART RATE: 68 BPM

## 2024-11-14 DIAGNOSIS — Z36.89 ENCOUNTER FOR TRIAGE IN PREGNANT PATIENT: Primary | ICD-10-CM

## 2024-11-14 LAB
ALBUMIN UR-MCNC: 10 MG/DL
APPEARANCE UR: ABNORMAL
BACTERIA #/AREA URNS HPF: ABNORMAL /HPF
BACTERIAL VAGINOSIS VAG-IMP: NEGATIVE
BILIRUB UR QL STRIP: NEGATIVE
CANDIDA DNA VAG QL NAA+PROBE: NOT DETECTED
CANDIDA GLABRATA / CANDIDA KRUSEI DNA: NOT DETECTED
COLOR UR AUTO: ABNORMAL
GLUCOSE UR STRIP-MCNC: NEGATIVE MG/DL
HGB UR QL STRIP: NEGATIVE
KETONES UR STRIP-MCNC: NEGATIVE MG/DL
LEUKOCYTE ESTERASE UR QL STRIP: ABNORMAL
MUCOUS THREADS #/AREA URNS LPF: PRESENT /LPF
NITRATE UR QL: NEGATIVE
PH UR STRIP: 6.5 [PH] (ref 5–9)
RBC URINE: 1 /HPF
RUPTURE OF FETAL MEMBRANES BY ROM PLUS: NEGATIVE
SP GR UR STRIP: 1.02 (ref 1–1.03)
SQUAMOUS EPITHELIAL: 2 /HPF
T VAGINALIS DNA VAG QL NAA+PROBE: NOT DETECTED
UROBILINOGEN UR STRIP-MCNC: NORMAL MG/DL
WBC URINE: 23 /HPF

## 2024-11-14 PROCEDURE — 84112 EVAL AMNIOTIC FLUID PROTEIN: CPT | Performed by: OBSTETRICS & GYNECOLOGY

## 2024-11-14 PROCEDURE — 81001 URINALYSIS AUTO W/SCOPE: CPT | Performed by: OBSTETRICS & GYNECOLOGY

## 2024-11-14 PROCEDURE — 999N000104 HC STATISTIC NO CHARGE: Performed by: FAMILY MEDICINE

## 2024-11-14 PROCEDURE — 0352U MULTIPLEX VAGINAL PANEL BY PCR: CPT | Performed by: OBSTETRICS & GYNECOLOGY

## 2024-11-14 PROCEDURE — G0463 HOSPITAL OUTPT CLINIC VISIT: HCPCS

## 2024-11-14 PROCEDURE — 87086 URINE CULTURE/COLONY COUNT: CPT | Performed by: OBSTETRICS & GYNECOLOGY

## 2024-11-14 PROCEDURE — G0463 HOSPITAL OUTPT CLINIC VISIT: HCPCS | Mod: 25

## 2024-11-14 RX ORDER — LIDOCAINE 40 MG/G
CREAM TOPICAL
Status: DISCONTINUED | OUTPATIENT
Start: 2024-11-14 | End: 2024-11-14 | Stop reason: HOSPADM

## 2024-11-14 ASSESSMENT — COLUMBIA-SUICIDE SEVERITY RATING SCALE - C-SSRS
2. HAVE YOU ACTUALLY HAD ANY THOUGHTS OF KILLING YOURSELF IN THE PAST MONTH?: NO
1. IN THE PAST MONTH, HAVE YOU WISHED YOU WERE DEAD OR WISHED YOU COULD GO TO SLEEP AND NOT WAKE UP?: NO
6. HAVE YOU EVER DONE ANYTHING, STARTED TO DO ANYTHING, OR PREPARED TO DO ANYTHING TO END YOUR LIFE?: NO

## 2024-11-14 ASSESSMENT — ACTIVITIES OF DAILY LIVING (ADL)
ADLS_ACUITY_SCORE: 0

## 2024-11-14 NOTE — PROGRESS NOTES
Admission Note    Data:  Craol Patterson admitted to [facility] from emergency room at 1230.      Action:  Dr. Martines has been notified of admission. Pt oriented to unit, call light in reach.     Response:  Patient is agreeable to plan.

## 2024-11-14 NOTE — ED TRIAGE NOTES
"Patient arrives from work with complaints of cough, after cough started to feel some abdominal pressure and noticed that she was leaking watery white discharge.Started around 1000 today.   /74   Pulse 68   Temp 98.2  F (36.8  C) (Tympanic)   Resp 16   Ht 1.6 m (5' 3\")   Wt 55.8 kg (123 lb)   LMP 06/05/2024 (Approximate)   SpO2 100%   BMI 21.79 kg/m    Thalia Phillips RN on 11/14/2024 at 12:10 PM       Triage Assessment (Adult)       Row Name 11/14/24 1206          Triage Assessment    Airway WDL WDL        Respiratory WDL    Respiratory WDL WDL        Skin Circulation/Temperature WDL    Skin Circulation/Temperature WDL WDL        Peripheral/Neurovascular WDL    Peripheral Neurovascular WDL WDL        Cognitive/Neuro/Behavioral WDL    Cognitive/Neuro/Behavioral WDL WDL                     "

## 2024-11-14 NOTE — PROGRESS NOTES
notified of pt test results.  order on a urine culture to the previous urine specimen. Pt is okay to discharge home. Will call if urine culture comes back with abnormal results.

## 2024-11-14 NOTE — PROGRESS NOTES
Dr.Johnson called at 1303 to give her an update on the Pt.  ordered a urine, MVP, and ROM+. Labs sent and will follow up with  after results are back.

## 2024-11-14 NOTE — PROGRESS NOTES
Discharge Note      Data:  Carol Patterson discharged to home at 1504 via ambulation. Accompanied by spouse and staff.    Action:  Written discharge/follow-up instructions were provided to patient. Prescriptions - None ordered for discharge. All belongings sent with patient.    Response:  Pt verbalized understanding of discharge instructions, reason for discharge, and necessary follow-up appointments. Without further questions or concerns at this time.

## 2024-11-15 LAB — BACTERIA UR CULT: NORMAL

## 2024-11-27 ENCOUNTER — PRENATAL OFFICE VISIT (OUTPATIENT)
Dept: OBGYN | Facility: OTHER | Age: 24
End: 2024-11-27
Attending: FAMILY MEDICINE
Payer: MEDICAID

## 2024-11-27 VITALS
WEIGHT: 123.9 LBS | HEART RATE: 79 BPM | BODY MASS INDEX: 21.95 KG/M2 | DIASTOLIC BLOOD PRESSURE: 64 MMHG | SYSTOLIC BLOOD PRESSURE: 122 MMHG

## 2024-11-27 DIAGNOSIS — J06.9 RECENT URI: ICD-10-CM

## 2024-11-27 DIAGNOSIS — Z87.59 HISTORY OF PRIOR PREGNANCY WITH IUGR NEWBORN: ICD-10-CM

## 2024-11-27 DIAGNOSIS — F41.9 ANXIETY: ICD-10-CM

## 2024-11-27 DIAGNOSIS — Z34.92 ENCOUNTER FOR PREGNANCY RELATED EXAMINATION IN SECOND TRIMESTER: Primary | ICD-10-CM

## 2024-11-27 PROBLEM — Z36.89 ENCOUNTER FOR TRIAGE IN PREGNANT PATIENT: Status: RESOLVED | Noted: 2024-11-14 | Resolved: 2024-11-27

## 2024-11-27 ASSESSMENT — PAIN SCALES - GENERAL: PAINLEVEL_OUTOF10: MILD PAIN (3)

## 2024-11-27 NOTE — PATIENT INSTRUCTIONS
sinus congestion and cold   o Clor-Trimetron  (chlorpheniramine)  o Coricidin  HBP Chest Congestion and Cough (dextromethorphan, guaifenesin)  o Ocean Mist  nasal spray (saline [sodium chloride] nasal sprays)  o Sudafed  (pseudoephedrine) Avoid in the first trimester.   Avoid all products with phenylpropanolamine and phenylephrine.      cough (alcohol-free syrup)   o Vicks  Nature Fusion  Cough (dextromethorphan hydrobromide)  o Delsym  12-Hour Extended-release Suspension (dextromethorphan polistirex)  o Coricidin  HBP Chest Congestion and Cough or Adult Robitussin  Peak Cold Cough and Chest (dextromethorphan, guaifenesin)  o Mucinex  (guaifenesin)     fever   o Tylenol  (acetaminophen) Take 650 milligrams (mg) every four hours for temperature higher than 100.4 degrees F. If you have a fever for 12 hours, call your health care provider.     allergy   o Alavert  or Claritin  (loratadine)  o Benadryl  (diphenhydramine)  o Zyrtec  (cetirizine)     heartburn   o Gaviscon  (aluminum hydroxide, magnesium carbonate)  o Maalox  tablets or Rolaids  or Titralac  or Tums  (calcium carbonate) Take less than 2,000 mg.  o Maalox  Advanced Regular Strength Liquid or Mylanta  Regular Strength Classic Liquid (aluminum hydroxide, magnesium hydroxide, simethicone)  o Pepcid  AC (famotidine)

## 2024-11-27 NOTE — PROGRESS NOTES
Pt presents to clinic today for prenatal care 25w5d. Pt denies any bleeding, or leakage of fluid at this time. States baby is moving good. Patient denies contractions. Patient complains of a cough for the past month.    Hakan Stover LPN...........................11/27/2024 11:59 AM
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
## 2024-12-06 ENCOUNTER — PATIENT OUTREACH (OUTPATIENT)
Dept: CARE COORDINATION | Facility: CLINIC | Age: 24
End: 2024-12-06
Payer: MEDICAID

## 2024-12-08 ENCOUNTER — HOSPITAL ENCOUNTER (OUTPATIENT)
Facility: OTHER | Age: 24
Discharge: HOME | End: 2024-12-08
Attending: OBSTETRICS & GYNECOLOGY | Admitting: OBSTETRICS & GYNECOLOGY
Payer: MEDICAID

## 2024-12-08 VITALS
RESPIRATION RATE: 18 BRPM | DIASTOLIC BLOOD PRESSURE: 65 MMHG | SYSTOLIC BLOOD PRESSURE: 128 MMHG | TEMPERATURE: 98.7 F | HEART RATE: 93 BPM | OXYGEN SATURATION: 97 %

## 2024-12-08 DIAGNOSIS — Z36.89 ENCOUNTER FOR TRIAGE IN PREGNANT PATIENT: Primary | ICD-10-CM

## 2024-12-08 LAB
ALBUMIN UR-MCNC: 10 MG/DL
APPEARANCE UR: CLEAR
BACTERIA #/AREA URNS HPF: ABNORMAL /HPF
BACTERIAL VAGINOSIS VAG-IMP: NEGATIVE
BILIRUB UR QL STRIP: NEGATIVE
CANDIDA DNA VAG QL NAA+PROBE: NOT DETECTED
CANDIDA GLABRATA / CANDIDA KRUSEI DNA: NOT DETECTED
COLOR UR AUTO: ABNORMAL
GLUCOSE UR STRIP-MCNC: 70 MG/DL
HGB UR QL STRIP: NEGATIVE
KETONES UR STRIP-MCNC: NEGATIVE MG/DL
LEUKOCYTE ESTERASE UR QL STRIP: ABNORMAL
MUCOUS THREADS #/AREA URNS LPF: PRESENT /LPF
NITRATE UR QL: NEGATIVE
PH UR STRIP: 6.5 [PH] (ref 5–9)
RBC URINE: 1 /HPF
RUPTURE OF FETAL MEMBRANES BY ROM PLUS: NEGATIVE
SP GR UR STRIP: 1.02 (ref 1–1.03)
T VAGINALIS DNA VAG QL NAA+PROBE: NOT DETECTED
UROBILINOGEN UR STRIP-MCNC: NORMAL MG/DL
WBC URINE: 9 /HPF

## 2024-12-08 PROCEDURE — 0352U MULTIPLEX VAGINAL PANEL BY PCR: CPT | Performed by: OBSTETRICS & GYNECOLOGY

## 2024-12-08 PROCEDURE — G0463 HOSPITAL OUTPT CLINIC VISIT: HCPCS | Mod: 25

## 2024-12-08 PROCEDURE — 999N000104 HC STATISTIC NO CHARGE: Performed by: FAMILY MEDICINE

## 2024-12-08 PROCEDURE — 84112 EVAL AMNIOTIC FLUID PROTEIN: CPT | Performed by: OBSTETRICS & GYNECOLOGY

## 2024-12-08 PROCEDURE — 81001 URINALYSIS AUTO W/SCOPE: CPT | Performed by: OBSTETRICS & GYNECOLOGY

## 2024-12-08 PROCEDURE — G0463 HOSPITAL OUTPT CLINIC VISIT: HCPCS

## 2024-12-08 RX ORDER — LIDOCAINE 40 MG/G
CREAM TOPICAL
Status: DISCONTINUED | OUTPATIENT
Start: 2024-12-08 | End: 2024-12-08 | Stop reason: HOSPADM

## 2024-12-08 ASSESSMENT — ACTIVITIES OF DAILY LIVING (ADL)
ADLS_ACUITY_SCORE: 41
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
## 2024-12-09 NOTE — PROGRESS NOTES
NSG DISCHARGE NOTE    Patient discharged to home at 8:43PM via ambulation. Accompanied by other friend and staff. Discharge instructions reviewed with patient, opportunity offered to ask questions. Prescriptions - None ordered for discharge. All belongings sent with patient.    Brionna Benson RN

## 2024-12-09 NOTE — DISCHARGE INSTRUCTIONS
Please return to [facility] if your symptoms return or get worse. If you are having contractions every 3-5 minutes lasting 60-90 seconds and increasing in intensity, if you have any fluid leaking or vaginal bleeding or if you are not feeling your baby move come to [facility] or call your health care provider. Feel free to come in or call [facility] with any questions or concerns.

## 2024-12-10 NOTE — PROGRESS NOTES
Clinic Care Coordination Contact   Initiated phone contact with patient to check on her status and possible need for resources/support.     Phone call said that phone could not accept calls at this time.     Writer texted patient brief message encouraging her to call back to discuss her status and any possible need for resources/support.    Plan:  Ongoing Care Coordination to assess and offer assist with community resources/referrals; ongoing encouragement, support as needed.     HERMILA Rivas on 12/10/2024 at 4:56 PM

## 2024-12-17 ENCOUNTER — PRENATAL OFFICE VISIT (OUTPATIENT)
Dept: OBGYN | Facility: OTHER | Age: 24
End: 2024-12-17
Attending: OBSTETRICS & GYNECOLOGY
Payer: MEDICAID

## 2024-12-17 VITALS
SYSTOLIC BLOOD PRESSURE: 136 MMHG | OXYGEN SATURATION: 99 % | DIASTOLIC BLOOD PRESSURE: 68 MMHG | HEART RATE: 85 BPM | BODY MASS INDEX: 22.28 KG/M2 | RESPIRATION RATE: 16 BRPM | WEIGHT: 125.8 LBS

## 2024-12-17 DIAGNOSIS — N89.8 VAGINAL DISCHARGE DURING PREGNANCY IN THIRD TRIMESTER: ICD-10-CM

## 2024-12-17 DIAGNOSIS — Z3A.28 28 WEEKS GESTATION OF PREGNANCY: ICD-10-CM

## 2024-12-17 DIAGNOSIS — Z34.92 ENCOUNTER FOR PREGNANCY RELATED EXAMINATION IN SECOND TRIMESTER: Primary | ICD-10-CM

## 2024-12-17 DIAGNOSIS — Z87.59 HISTORY OF PRIOR PREGNANCY WITH IUGR NEWBORN: ICD-10-CM

## 2024-12-17 DIAGNOSIS — O26.893 VAGINAL DISCHARGE DURING PREGNANCY IN THIRD TRIMESTER: ICD-10-CM

## 2024-12-17 LAB
BACTERIAL VAGINOSIS VAG-IMP: NEGATIVE
BASOPHILS # BLD AUTO: 0 10E3/UL (ref 0–0.2)
BASOPHILS NFR BLD AUTO: 0 %
CANDIDA DNA VAG QL NAA+PROBE: NOT DETECTED
CANDIDA GLABRATA / CANDIDA KRUSEI DNA: NOT DETECTED
EOSINOPHIL # BLD AUTO: 0.1 10E3/UL (ref 0–0.7)
EOSINOPHIL NFR BLD AUTO: 2 %
ERYTHROCYTE [DISTWIDTH] IN BLOOD BY AUTOMATED COUNT: 13.7 % (ref 10–15)
GLUCOSE 1H P 50 G GLC PO SERPL-MCNC: 136 MG/DL (ref 70–129)
HCT VFR BLD AUTO: 30.9 % (ref 35–47)
HGB BLD-MCNC: 9.9 G/DL (ref 11.7–15.7)
IMM GRANULOCYTES # BLD: 0.1 10E3/UL
IMM GRANULOCYTES NFR BLD: 1 %
LYMPHOCYTES # BLD AUTO: 1.4 10E3/UL (ref 0.8–5.3)
LYMPHOCYTES NFR BLD AUTO: 16 %
MCH RBC QN AUTO: 28.2 PG (ref 26.5–33)
MCHC RBC AUTO-ENTMCNC: 32 G/DL (ref 31.5–36.5)
MCV RBC AUTO: 88 FL (ref 78–100)
MONOCYTES # BLD AUTO: 0.5 10E3/UL (ref 0–1.3)
MONOCYTES NFR BLD AUTO: 6 %
NEUTROPHILS # BLD AUTO: 6.7 10E3/UL (ref 1.6–8.3)
NEUTROPHILS NFR BLD AUTO: 76 %
NRBC # BLD AUTO: 0 10E3/UL
NRBC BLD AUTO-RTO: 0 /100
PLATELET # BLD AUTO: 305 10E3/UL (ref 150–450)
RBC # BLD AUTO: 3.51 10E6/UL (ref 3.8–5.2)
T VAGINALIS DNA VAG QL NAA+PROBE: NOT DETECTED
WBC # BLD AUTO: 8.9 10E3/UL (ref 4–11)

## 2024-12-17 PROCEDURE — 99207 PR OB VISIT-NO CHARGE - GICH ONLY: CPT | Performed by: OBSTETRICS & GYNECOLOGY

## 2024-12-17 PROCEDURE — 0352U MULTIPLEX VAGINAL PANEL BY PCR: CPT | Mod: ZL | Performed by: OBSTETRICS & GYNECOLOGY

## 2024-12-17 PROCEDURE — G0463 HOSPITAL OUTPT CLINIC VISIT: HCPCS | Mod: 25

## 2024-12-17 PROCEDURE — 85048 AUTOMATED LEUKOCYTE COUNT: CPT | Mod: ZL | Performed by: OBSTETRICS & GYNECOLOGY

## 2024-12-17 PROCEDURE — 85004 AUTOMATED DIFF WBC COUNT: CPT | Mod: ZL | Performed by: OBSTETRICS & GYNECOLOGY

## 2024-12-17 PROCEDURE — 86780 TREPONEMA PALLIDUM: CPT | Mod: ZL | Performed by: OBSTETRICS & GYNECOLOGY

## 2024-12-17 PROCEDURE — 90471 IMMUNIZATION ADMIN: CPT

## 2024-12-17 PROCEDURE — 90715 TDAP VACCINE 7 YRS/> IM: CPT

## 2024-12-17 PROCEDURE — 82950 GLUCOSE TEST: CPT | Mod: ZL | Performed by: OBSTETRICS & GYNECOLOGY

## 2024-12-17 PROCEDURE — 36415 COLL VENOUS BLD VENIPUNCTURE: CPT | Mod: ZL | Performed by: OBSTETRICS & GYNECOLOGY

## 2024-12-17 ASSESSMENT — PAIN SCALES - GENERAL: PAINLEVEL_OUTOF10: SEVERE PAIN (6)
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
## 2024-12-17 NOTE — NURSING NOTE
"Chief Complaint   Patient presents with    Prenatal Care     28 weeks 4 days        Initial /68   Pulse 85   Resp 16   Wt 57.1 kg (125 lb 12.8 oz)   LMP 06/05/2024 (Approximate)   SpO2 99%   Breastfeeding No   BMI 22.28 kg/m   Estimated body mass index is 22.28 kg/m  as calculated from the following:    Height as of 11/14/24: 1.6 m (5' 3\").    Weight as of this encounter: 57.1 kg (125 lb 12.8 oz).  Medication Reconciliation: complete    Alethea Taylor CMA    "

## 2024-12-18 LAB — T PALLIDUM AB SER QL: NONREACTIVE

## 2024-12-24 DIAGNOSIS — Z34.93 THIRD TRIMESTER PREGNANCY: ICD-10-CM

## 2024-12-24 DIAGNOSIS — R73.09 ELEVATED GLUCOSE: Primary | ICD-10-CM

## 2025-01-16 ENCOUNTER — PRENATAL OFFICE VISIT (OUTPATIENT)
Dept: OBGYN | Facility: OTHER | Age: 25
End: 2025-01-16
Attending: OBSTETRICS & GYNECOLOGY
Payer: COMMERCIAL

## 2025-01-16 VITALS
BODY MASS INDEX: 23.1 KG/M2 | DIASTOLIC BLOOD PRESSURE: 72 MMHG | HEART RATE: 71 BPM | SYSTOLIC BLOOD PRESSURE: 116 MMHG | RESPIRATION RATE: 16 BRPM | WEIGHT: 130.4 LBS | OXYGEN SATURATION: 100 %

## 2025-01-16 DIAGNOSIS — O26.893 VAGINAL DISCHARGE DURING PREGNANCY IN THIRD TRIMESTER: ICD-10-CM

## 2025-01-16 DIAGNOSIS — Z3A.32 32 WEEKS GESTATION OF PREGNANCY: ICD-10-CM

## 2025-01-16 DIAGNOSIS — N89.8 VAGINAL DISCHARGE DURING PREGNANCY IN THIRD TRIMESTER: ICD-10-CM

## 2025-01-16 DIAGNOSIS — Z34.93 ENCOUNTER FOR PREGNANCY RELATED EXAMINATION IN THIRD TRIMESTER: Primary | ICD-10-CM

## 2025-01-16 DIAGNOSIS — R30.0 DYSURIA: ICD-10-CM

## 2025-01-16 DIAGNOSIS — R30.0 DYSURIA: Primary | ICD-10-CM

## 2025-01-16 LAB
BACTERIAL VAGINOSIS VAG-IMP: NEGATIVE
CANDIDA DNA VAG QL NAA+PROBE: NOT DETECTED
CANDIDA GLABRATA / CANDIDA KRUSEI DNA: NOT DETECTED
T VAGINALIS DNA VAG QL NAA+PROBE: NOT DETECTED

## 2025-01-16 PROCEDURE — 81515 NFCT DS BV&VAGINITIS DNA ALG: CPT | Mod: ZL | Performed by: OBSTETRICS & GYNECOLOGY

## 2025-01-16 ASSESSMENT — PAIN SCALES - GENERAL: PAINLEVEL_OUTOF10: NO PAIN (0)
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
## 2025-01-16 NOTE — NURSING NOTE
"Chief Complaint   Patient presents with    Prenatal Care     32.6 weeks       Initial /72   Pulse 71   Resp 16   Wt 59.1 kg (130 lb 6.4 oz)   LMP 06/05/2024 (Approximate)   SpO2 100%   BMI 23.10 kg/m   Estimated body mass index is 23.1 kg/m  as calculated from the following:    Height as of 11/14/24: 1.6 m (5' 3\").    Weight as of this encounter: 59.1 kg (130 lb 6.4 oz).  Medication Review: complete    The next two questions are to help us understand your food security.  If you are feeling you need any assistance in this area, we have resources available to support you today.           No data to display                  Health Care Directive:  Patient does not have a Health Care Directive: Discussed advance care planning with patient; however, patient declined at this time.    Erma Pettit LPN      "

## 2025-01-17 ENCOUNTER — HOSPITAL ENCOUNTER (OUTPATIENT)
Facility: OTHER | Age: 25
Discharge: HOME | End: 2025-01-17
Attending: OBSTETRICS & GYNECOLOGY | Admitting: OBSTETRICS & GYNECOLOGY
Payer: COMMERCIAL

## 2025-01-17 ENCOUNTER — HOSPITAL ENCOUNTER (OUTPATIENT)
Facility: OTHER | Age: 25
End: 2025-01-17
Admitting: OBSTETRICS & GYNECOLOGY
Payer: COMMERCIAL

## 2025-01-17 VITALS
SYSTOLIC BLOOD PRESSURE: 117 MMHG | TEMPERATURE: 97.9 F | RESPIRATION RATE: 16 BRPM | WEIGHT: 130 LBS | DIASTOLIC BLOOD PRESSURE: 55 MMHG | OXYGEN SATURATION: 98 % | BODY MASS INDEX: 23.03 KG/M2 | HEART RATE: 75 BPM

## 2025-01-17 DIAGNOSIS — Z36.89 ENCOUNTER FOR TRIAGE IN PREGNANT PATIENT: Primary | ICD-10-CM

## 2025-01-17 LAB
ALBUMIN UR-MCNC: 10 MG/DL
APPEARANCE UR: CLEAR
BACTERIA #/AREA URNS HPF: ABNORMAL /HPF
BILIRUB UR QL STRIP: NEGATIVE
COLOR UR AUTO: ABNORMAL
GLUCOSE UR STRIP-MCNC: NEGATIVE MG/DL
HGB UR QL STRIP: NEGATIVE
HYALINE CASTS: 2 /LPF
KETONES UR STRIP-MCNC: NEGATIVE MG/DL
LEUKOCYTE ESTERASE UR QL STRIP: ABNORMAL
MUCOUS THREADS #/AREA URNS LPF: PRESENT /LPF
NITRATE UR QL: NEGATIVE
PH UR STRIP: 6.5 [PH] (ref 5–9)
RBC URINE: <1 /HPF
RUPTURE OF FETAL MEMBRANES BY ROM PLUS: NEGATIVE
SP GR UR STRIP: 1.01 (ref 1–1.03)
SQUAMOUS EPITHELIAL: 1 /HPF
UROBILINOGEN UR STRIP-MCNC: NORMAL MG/DL
WBC URINE: 11 /HPF

## 2025-01-17 PROCEDURE — 81001 URINALYSIS AUTO W/SCOPE: CPT | Performed by: OBSTETRICS & GYNECOLOGY

## 2025-01-17 PROCEDURE — G0463 HOSPITAL OUTPT CLINIC VISIT: HCPCS | Mod: 25

## 2025-01-17 PROCEDURE — 250N000013 HC RX MED GY IP 250 OP 250 PS 637: Performed by: OBSTETRICS & GYNECOLOGY

## 2025-01-17 PROCEDURE — 999N000104 HC STATISTIC NO CHARGE: Performed by: FAMILY MEDICINE

## 2025-01-17 PROCEDURE — 87086 URINE CULTURE/COLONY COUNT: CPT | Performed by: OBSTETRICS & GYNECOLOGY

## 2025-01-17 PROCEDURE — 81003 URINALYSIS AUTO W/O SCOPE: CPT | Performed by: OBSTETRICS & GYNECOLOGY

## 2025-01-17 PROCEDURE — 84112 EVAL AMNIOTIC FLUID PROTEIN: CPT | Performed by: OBSTETRICS & GYNECOLOGY

## 2025-01-17 PROCEDURE — G0463 HOSPITAL OUTPT CLINIC VISIT: HCPCS

## 2025-01-17 RX ORDER — HYDROXYZINE PAMOATE 25 MG/1
50 CAPSULE ORAL ONCE
Status: COMPLETED | OUTPATIENT
Start: 2025-01-17 | End: 2025-01-17

## 2025-01-17 RX ADMIN — HYDROXYZINE PAMOATE 50 MG: 25 CAPSULE ORAL at 23:12

## 2025-01-17 ASSESSMENT — ACTIVITIES OF DAILY LIVING (ADL)
ADLS_ACUITY_SCORE: 15
ADLS_ACUITY_SCORE: 15

## 2025-01-18 NOTE — PROGRESS NOTES
NSG DISCHARGE NOTE    Patient discharged to home at 11:18 PM via ambulation. Accompanied by significant other and staff. Discharge instructions reviewed with patient, opportunity offered to ask questions. Prescriptions - None ordered for discharge. All belongings sent with patient.    Brionna Benson RN

## 2025-01-18 NOTE — ED TRIAGE NOTES
Pt presents to ED with c/o either urination or rupture of membranes. Pt is 33wks gestation. Pt had all over pain earlier today including abd pain. PT was in the shower when she felt a release of fluids.    Francisca Yancey RN on 1/17/2025 at 9:30 PM

## 2025-01-18 NOTE — PROGRESS NOTES
24 year old  at 33 weeks gestation to [facility] room 402 from ER. Settled in bed, oriented to room, EFM/EUM applied, assessment done. Patient resting in bed to pool for ROM+.

## 2025-01-20 LAB — BACTERIA UR CULT: NO GROWTH

## 2025-01-30 ENCOUNTER — OFFICE VISIT (OUTPATIENT)
Dept: FAMILY MEDICINE | Facility: OTHER | Age: 25
End: 2025-01-30
Attending: FAMILY MEDICINE
Payer: COMMERCIAL

## 2025-01-30 VITALS
DIASTOLIC BLOOD PRESSURE: 52 MMHG | RESPIRATION RATE: 20 BRPM | OXYGEN SATURATION: 98 % | HEART RATE: 90 BPM | TEMPERATURE: 97.9 F | BODY MASS INDEX: 23.52 KG/M2 | WEIGHT: 132.8 LBS | SYSTOLIC BLOOD PRESSURE: 102 MMHG

## 2025-01-30 DIAGNOSIS — F41.9 ANXIETY: Primary | ICD-10-CM

## 2025-01-30 PROCEDURE — G0463 HOSPITAL OUTPT CLINIC VISIT: HCPCS

## 2025-01-30 ASSESSMENT — ANXIETY QUESTIONNAIRES
IF YOU CHECKED OFF ANY PROBLEMS ON THIS QUESTIONNAIRE, HOW DIFFICULT HAVE THESE PROBLEMS MADE IT FOR YOU TO DO YOUR WORK, TAKE CARE OF THINGS AT HOME, OR GET ALONG WITH OTHER PEOPLE: SOMEWHAT DIFFICULT
GAD7 TOTAL SCORE: 16
3. WORRYING TOO MUCH ABOUT DIFFERENT THINGS: NEARLY EVERY DAY
5. BEING SO RESTLESS THAT IT IS HARD TO SIT STILL: NOT AT ALL
1. FEELING NERVOUS, ANXIOUS, OR ON EDGE: NEARLY EVERY DAY
7. FEELING AFRAID AS IF SOMETHING AWFUL MIGHT HAPPEN: SEVERAL DAYS
2. NOT BEING ABLE TO STOP OR CONTROL WORRYING: NEARLY EVERY DAY
6. BECOMING EASILY ANNOYED OR IRRITABLE: NEARLY EVERY DAY
GAD7 TOTAL SCORE: 16

## 2025-01-30 ASSESSMENT — PAIN SCALES - GENERAL: PAINLEVEL_OUTOF10: NO PAIN (0)

## 2025-01-30 ASSESSMENT — PATIENT HEALTH QUESTIONNAIRE - PHQ9
5. POOR APPETITE OR OVEREATING: NEARLY EVERY DAY
SUM OF ALL RESPONSES TO PHQ QUESTIONS 1-9: 18

## 2025-01-30 NOTE — NURSING NOTE
Patient here for anxiety she was given Atarax but does not want to take medications while pregnant. Medication Reconciliation: complete.    Aurora Valenzuela LPN  1/30/2025 3:00 PM

## 2025-01-30 NOTE — LETTER
My Asthma Action Plan    Name: Carol Patterson   YOB: 2000  Date: 1/30/2025   My doctor: Cody Zhong MD   My clinic: [facility] AND [facility]        My Rescue Medicine:   Albuterol inhaler (Proair/Ventolin/Proventil HFA)  2-4 puffs EVERY 4 HOURS as needed. Use a spacer if recommended by your provider.   My Asthma Severity:     Know your asthma triggers:              GREEN ZONE   Good Control  I feel good  No cough or wheeze  Can work, sleep and play without asthma symptoms       Take your asthma control medicine every day.     If exercise triggers your asthma, take your rescue medication  15 minutes before exercise or sports, and  During exercise if you have asthma symptoms  Spacer to use with inhaler: If you have a spacer, make sure to use it with your inhaler             YELLOW ZONE Getting Worse  I have ANY of these:  I do not feel good  Cough or wheeze  Chest feels tight  Wake up at night   Keep taking your Green Zone medications  Start taking your rescue medicine:  every 20 minutes for up to 1 hour. Then every 4 hours for 24-48 hours.  If you stay in the Yellow Zone for more than 12-24 hours, contact your doctor.  If you do not return to the Green Zone in 12-24 hours or you get worse, start taking your oral steroid medicine if prescribed by your provider.           RED ZONE Medical Alert - Get Help  I have ANY of these:  I feel awful  Medicine is not helping  Breathing getting harder  Trouble walking or talking  Nose opens wide to breathe       Take your rescue medicine NOW  If your provider has prescribed an oral steroid medicine, start taking it NOW  Call your doctor NOW  If you are still in the Red Zone after 20 minutes and you have not reached your doctor:  Take your rescue medicine again and  Call 911 or go to the emergency room right away    See your regular doctor within 2 weeks of an Emergency Room or Urgent Care visit for follow-up treatment.          Annual  Reminders:  Meet with Asthma Educator,  Flu Shot in the Fall, consider Pneumonia Vaccination for patients with asthma (aged 19 and older).    Pharmacy:    [id number] - [place], [place] - [address]  [facility] - [place], [place] - [address]    Electronically signed by Cody Zhong MD   Date: 01/30/25                    Asthma Triggers  How To Control Things That Make Your Asthma Worse    Triggers are things that make your asthma worse.  Look at the list below to help you find your triggers and   what you can do about them. You can help prevent asthma flare-ups by staying away from your triggers.      Trigger                                                          What you can do   Cigarette Smoke  Tobacco smoke can make asthma worse. Do not allow smoking in your home, car or around you.  Be sure no one smokes at a child s day care or school.  If you smoke, ask your health care provider for ways to help you quit.  Ask family members to quit too.  Ask your health care provider for a referral to Quit Plan to help you quit smoking, or call 3-779-950-PLAN.     Colds, Flu, Bronchitis  These are common triggers of asthma. Wash your hands often.  Don t touch your eyes, nose or mouth.  Get a flu shot every year.     Dust Mites  These are tiny bugs that live in cloth or carpet. They are too small to see. Wash sheets and blankets in hot water every week.   Encase pillows and mattress in dust mite proof covers.  Avoid having carpet if you can. If you have carpet, vacuum weekly.   Use a dust mask and HEPA vacuum.   Pollen and Outdoor Mold  Some people are allergic to trees, grass, or weed pollen, or molds. Try to keep your windows closed.  Limit time out doors when pollen count is high.   Ask you health care provider about taking medicine during allergy season.     Animal Dander  Some people are allergic to skin flakes, urine or saliva from pets with fur or feathers. Keep  pets with fur or feathers out of your home.    If you can t keep the pet outdoors, then keep the pet out of your bedroom.  Keep the bedroom door closed.  Keep pets off cloth furniture and away from stuffed toys.     Mice, Rats, and Cockroaches  Some people are allergic to the waste from these pests.   Cover food and garbage.  Clean up spills and food crumbs.  Store grease in the refrigerator.   Keep food out of the bedroom.   Indoor Mold  This can be a trigger if your home has high moisture. Fix leaking faucets, pipes, or other sources of water.   Clean moldy surfaces.  Dehumidify basement if it is damp and smelly.   Smoke, Strong Odors, and Sprays  These can reduce air quality. Stay away from strong odors and sprays, such as perfume, powder, hair spray, paints, smoke incense, paint, cleaning products, candles and new carpet.   Exercise or Sports  Some people with asthma have this trigger. Be active!  Ask your doctor about taking medicine before sports or exercise to prevent symptoms.    Warm up for 5-10 minutes before and after sports or exercise.     Other Triggers of Asthma  Cold air:  Cover your nose and mouth with a scarf.  Sometimes laughing or crying can be a trigger.  Some medicines and food can trigger asthma.

## 2025-02-03 ASSESSMENT — ENCOUNTER SYMPTOMS: NERVOUS/ANXIOUS: 1

## 2025-02-03 NOTE — PROGRESS NOTES
SUBJECTIVE:   Carol Patterson is a 24 year old female who presents to clinic today for the following health issues:    Anxiety    History of Present Illness       Reason for visit:  Anxiety   She is taking medications regularly.    Patient arrives here for anxiety.  1 week ago she went out with did buy her a  animal and now requests a note indicating that she needs 1.  She is finding that the  animal has helped her anxiety here.  Her  has a animal.  But reports it does not seem to be enough for her.  The dog is more attached to him.  She states that she cannot afford the patch charge.  Patient reports she has had anxiety for years.  I do not see any recent treatment for it.  She states that she is gone to counseling in the past without any relief.  Patient reports that she would like to avoid all medications doing being that she is currently pregnant.    Patient Active Problem List    Diagnosis Date Noted    Anxiety 01/30/2025     Priority: Medium    Encounter for triage in pregnant patient 12/08/2024     Priority: Medium    Anemia during pregnancy in third trimester 01/21/2021     Priority: Medium    Allergic rhinoconjunctivitis 10/25/2016     Priority: Medium    Exercise-induced asthma 04/20/2016     Priority: Medium     Formatting of this note might be different from the original.   Patient thinks she outgrew this.  She no longer uses any kind of inhaler and never has any symptoms of this.      Encopresis 09/02/2011     Priority: Medium     Formatting of this note might be different from the original.  Updated per 10/1/17 IMO import Formatting of this note might be different from the original.  Overview: Updated per 10/1/17 IMO import  Formatting of this note might be different from the original.   Updated per 10/1/17 IMO import  Formatting of this note might be different from the original.   Overview:    Updated per 10/1/17 IMO import       Past Medical History:   Diagnosis Date     Anemia     Encounter for triage in pregnant patient 2024    Marginal insertion of umbilical cord affecting management of mother 2020    Formatting of this note might be different from the original.   Diagnosed on 20 week fetal survey. Will need serial growth ultrasounds and consider  testing starting at 32 weeks gestational age      Nexplanon in place 2021    Formatting of this note might be different from the original.   Insert date: 3/11/2021.   Removal due: 3/11/2024.      Poor fetal growth affecting management of mother in third trimester 2021    Formatting of this note might be different from the original.   Already getting twice a week  testing with biophysical profile and NST.  Will start twice a week umbilical cord doppler studies as well.  If these remain normal, plan to induce at 39 weeks gestational age.  If umbilical cord doppler study abnormal plan to induce before then.      Uncomplicated asthma     Urinary tract infection       No past surgical history on file.  Family History   Problem Relation Age of Onset    No Known Problems Father     Other - See Comments Brother         von willebrand disease type 1    Autism Spectrum Disorder Brother     Hearing Loss Brother     Attention Deficit Disorder Brother     Von Willebrand disease Brother     Skin Cancer Maternal Grandfather     Diabetes Type 2  Paternal Grandmother        Review of Systems   Psychiatric/Behavioral:  The patient is nervous/anxious.         OBJECTIVE:     /52   Pulse 90   Temp 97.9  F (36.6  C)   Resp 20   Wt 60.2 kg (132 lb 12.8 oz)   LMP 2024 (Approximate)   SpO2 98%   BMI 23.52 kg/m    Body mass index is 23.52 kg/m .  Physical Exam  Constitutional:       Appearance: Normal appearance.   Cardiovascular:      Rate and Rhythm: Normal rate.   Neurological:      Mental Status: She is alert.   Psychiatric:         Mood and Affect: Mood normal.         Thought Content: Thought  content normal.         Diagnostic Test Results:  none     ASSESSMENT/PLAN:         (F41.9) Anxiety  (primary encounter diagnosis)  Comment:   Plan: Adult Mental Health  Referral  I advised patient I felt it would be important to get into counseling and see if we get any success prior to pursuing a  animal.  Appointments made.      Cody Zhong MD  [facility] AND [facility]

## 2025-02-06 ENCOUNTER — HOSPITAL ENCOUNTER (OUTPATIENT)
Facility: OTHER | Age: 25
Discharge: HOME | End: 2025-02-06
Attending: STUDENT IN AN ORGANIZED HEALTH CARE EDUCATION/TRAINING PROGRAM | Admitting: STUDENT IN AN ORGANIZED HEALTH CARE EDUCATION/TRAINING PROGRAM
Payer: COMMERCIAL

## 2025-02-06 ENCOUNTER — OFFICE VISIT (OUTPATIENT)
Dept: BEHAVIORAL HEALTH | Facility: OTHER | Age: 25
End: 2025-02-06
Attending: COUNSELOR
Payer: COMMERCIAL

## 2025-02-06 VITALS — OXYGEN SATURATION: 99 % | SYSTOLIC BLOOD PRESSURE: 120 MMHG | TEMPERATURE: 96.8 F | DIASTOLIC BLOOD PRESSURE: 60 MMHG

## 2025-02-06 DIAGNOSIS — F41.1 GAD (GENERALIZED ANXIETY DISORDER): Primary | ICD-10-CM

## 2025-02-06 DIAGNOSIS — F33.41 RECURRENT MAJOR DEPRESSIVE DISORDER, IN PARTIAL REMISSION: ICD-10-CM

## 2025-02-06 LAB
BACTERIAL VAGINOSIS VAG-IMP: NEGATIVE
CANDIDA DNA VAG QL NAA+PROBE: NOT DETECTED
CANDIDA GLABRATA / CANDIDA KRUSEI DNA: NOT DETECTED
RUPTURE OF FETAL MEMBRANES BY ROM PLUS: NEGATIVE
T VAGINALIS DNA VAG QL NAA+PROBE: NOT DETECTED

## 2025-02-06 PROCEDURE — G0463 HOSPITAL OUTPT CLINIC VISIT: HCPCS | Mod: 25 | Performed by: REGISTERED NURSE

## 2025-02-06 PROCEDURE — 81515 NFCT DS BV&VAGINITIS DNA ALG: CPT | Performed by: STUDENT IN AN ORGANIZED HEALTH CARE EDUCATION/TRAINING PROGRAM

## 2025-02-06 PROCEDURE — 84112 EVAL AMNIOTIC FLUID PROTEIN: CPT | Performed by: STUDENT IN AN ORGANIZED HEALTH CARE EDUCATION/TRAINING PROGRAM

## 2025-02-06 PROCEDURE — 90832 PSYTX W PT 30 MINUTES: CPT | Performed by: COUNSELOR

## 2025-02-06 PROCEDURE — 96360 HYDRATION IV INFUSION INIT: CPT

## 2025-02-06 PROCEDURE — 99214 OFFICE O/P EST MOD 30 MIN: CPT | Performed by: STUDENT IN AN ORGANIZED HEALTH CARE EDUCATION/TRAINING PROGRAM

## 2025-02-06 PROCEDURE — 87653 STREP B DNA AMP PROBE: CPT | Performed by: STUDENT IN AN ORGANIZED HEALTH CARE EDUCATION/TRAINING PROGRAM

## 2025-02-06 PROCEDURE — 258N000003 HC RX IP 258 OP 636: Performed by: STUDENT IN AN ORGANIZED HEALTH CARE EDUCATION/TRAINING PROGRAM

## 2025-02-06 RX ORDER — LIDOCAINE 40 MG/G
CREAM TOPICAL
Status: DISCONTINUED | OUTPATIENT
Start: 2025-02-06 | End: 2025-02-06 | Stop reason: HOSPADM

## 2025-02-06 RX ADMIN — SODIUM CHLORIDE, POTASSIUM CHLORIDE, SODIUM LACTATE AND CALCIUM CHLORIDE 1000 ML: 600; 310; 30; 20 INJECTION, SOLUTION INTRAVENOUS at 17:09

## 2025-02-06 ASSESSMENT — ANXIETY QUESTIONNAIRES
7. FEELING AFRAID AS IF SOMETHING AWFUL MIGHT HAPPEN: NEARLY EVERY DAY
GAD7 TOTAL SCORE: 14
7. FEELING AFRAID AS IF SOMETHING AWFUL MIGHT HAPPEN: NEARLY EVERY DAY
IF YOU CHECKED OFF ANY PROBLEMS ON THIS QUESTIONNAIRE, HOW DIFFICULT HAVE THESE PROBLEMS MADE IT FOR YOU TO DO YOUR WORK, TAKE CARE OF THINGS AT HOME, OR GET ALONG WITH OTHER PEOPLE: VERY DIFFICULT
GAD7 TOTAL SCORE: 14
5. BEING SO RESTLESS THAT IT IS HARD TO SIT STILL: SEVERAL DAYS
1. FEELING NERVOUS, ANXIOUS, OR ON EDGE: MORE THAN HALF THE DAYS
4. TROUBLE RELAXING: SEVERAL DAYS
6. BECOMING EASILY ANNOYED OR IRRITABLE: MORE THAN HALF THE DAYS
8. IF YOU CHECKED OFF ANY PROBLEMS, HOW DIFFICULT HAVE THESE MADE IT FOR YOU TO DO YOUR WORK, TAKE CARE OF THINGS AT HOME, OR GET ALONG WITH OTHER PEOPLE?: VERY DIFFICULT
2. NOT BEING ABLE TO STOP OR CONTROL WORRYING: MORE THAN HALF THE DAYS
GAD7 TOTAL SCORE: 14
3. WORRYING TOO MUCH ABOUT DIFFERENT THINGS: NEARLY EVERY DAY

## 2025-02-06 ASSESSMENT — PATIENT HEALTH QUESTIONNAIRE - PHQ9
10. IF YOU CHECKED OFF ANY PROBLEMS, HOW DIFFICULT HAVE THESE PROBLEMS MADE IT FOR YOU TO DO YOUR WORK, TAKE CARE OF THINGS AT HOME, OR GET ALONG WITH OTHER PEOPLE: SOMEWHAT DIFFICULT
SUM OF ALL RESPONSES TO PHQ QUESTIONS 1-9: 11
SUM OF ALL RESPONSES TO PHQ QUESTIONS 1-9: 11

## 2025-02-06 ASSESSMENT — COLUMBIA-SUICIDE SEVERITY RATING SCALE - C-SSRS
3. HAVE YOU BEEN THINKING ABOUT HOW YOU MIGHT KILL YOURSELF?: NO
4. HAVE YOU HAD THESE THOUGHTS AND HAD SOME INTENTION OF ACTING ON THEM?: NO
2. HAVE YOU ACTUALLY HAD ANY THOUGHTS OF KILLING YOURSELF?: NO
REASONS FOR IDEATION LIFETIME: COMPLETELY TO GET ATTENTION, REVENGE, OR A REACTION FROM OTHERS
REASONS FOR IDEATION PAST MONTH: MOSTLY TO GET ATTENTION, REVENGE, OR A REACTION FROM OTHERS
2. HAVE YOU ACTUALLY HAD ANY THOUGHTS OF KILLING YOURSELF?: YES
TOTAL  NUMBER OF INTERRUPTED ATTEMPTS LIFETIME: NO
5. HAVE YOU STARTED TO WORK OUT OR WORKED OUT THE DETAILS OF HOW TO KILL YOURSELF? DO YOU INTEND TO CARRY OUT THIS PLAN?: NO
TOTAL  NUMBER OF ABORTED OR SELF INTERRUPTED ATTEMPTS LIFETIME: NO
1. IN THE PAST MONTH, HAVE YOU WISHED YOU WERE DEAD OR WISHED YOU COULD GO TO SLEEP AND NOT WAKE UP?: NO
6. HAVE YOU EVER DONE ANYTHING, STARTED TO DO ANYTHING, OR PREPARED TO DO ANYTHING TO END YOUR LIFE?: NO
1. HAVE YOU WISHED YOU WERE DEAD OR WISHED YOU COULD GO TO SLEEP AND NOT WAKE UP?: YES
ATTEMPT LIFETIME: NO

## 2025-02-06 ASSESSMENT — ACTIVITIES OF DAILY LIVING (ADL)
ADLS_ACUITY_SCORE: 15
ADLS_ACUITY_SCORE: 15
ADLS_ACUITY_SCORE: 41
ADLS_ACUITY_SCORE: 41
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
## 2025-02-06 NOTE — PROGRESS NOTES
Dr. Camargo here to examine patient. Cervical exam and swabs obtained per MD. Significant other at bedside.

## 2025-02-06 NOTE — PROGRESS NOTES
Patient arrives with complaints of increased watery discharge and abdominal cramping. Patient is 35 6/7 weeks gestation. Patient states the cramping and the discharge has been more noticeable the past 2 days. Patient is accompanied by significant other and daughter. External monitors applied. Patient positioned for pooling of amniotic fluid.

## 2025-02-06 NOTE — PROGRESS NOTES
Patient is randa every 2-4 minutes. Patient refusing swab test from nurse. Dr. Camargo notified and will come in to see patient.

## 2025-02-07 NOTE — PROGRESS NOTES
Discharge instructions given to patient and . Patient states she understands all information provided and will follow-up as scheduled in the clinic for her next OB visit. Patient will call or return if she has any questions or concerns. Patient discharged home with  in stable, satisfactory condition. Category I fetal tracing on discharge.

## 2025-02-08 LAB — GP B STREP DNA SPEC QL NAA+PROBE: NEGATIVE

## 2025-02-11 ENCOUNTER — PRENATAL OFFICE VISIT (OUTPATIENT)
Dept: OBGYN | Facility: OTHER | Age: 25
End: 2025-02-11
Attending: OBSTETRICS & GYNECOLOGY
Payer: COMMERCIAL

## 2025-02-11 VITALS
SYSTOLIC BLOOD PRESSURE: 124 MMHG | DIASTOLIC BLOOD PRESSURE: 72 MMHG | HEART RATE: 91 BPM | WEIGHT: 134.7 LBS | BODY MASS INDEX: 23.86 KG/M2

## 2025-02-11 DIAGNOSIS — O47.00 PRETERM CONTRACTIONS: ICD-10-CM

## 2025-02-11 DIAGNOSIS — R12 HEARTBURN DURING PREGNANCY IN THIRD TRIMESTER: ICD-10-CM

## 2025-02-11 DIAGNOSIS — Z3A.36 36 WEEKS GESTATION OF PREGNANCY: ICD-10-CM

## 2025-02-11 DIAGNOSIS — O26.893 HEARTBURN DURING PREGNANCY IN THIRD TRIMESTER: ICD-10-CM

## 2025-02-11 DIAGNOSIS — Z34.93 ENCOUNTER FOR PREGNANCY RELATED EXAMINATION IN THIRD TRIMESTER: Primary | ICD-10-CM

## 2025-02-11 RX ORDER — HYDROXYZINE PAMOATE 25 MG/1
50-100 CAPSULE ORAL 3 TIMES DAILY PRN
Qty: 60 CAPSULE | Refills: 1 | Status: SHIPPED | OUTPATIENT
Start: 2025-02-11

## 2025-02-11 RX ORDER — FAMOTIDINE 20 MG/1
20 TABLET, FILM COATED ORAL 2 TIMES DAILY
Qty: 30 TABLET | Refills: 1 | Status: SHIPPED | OUTPATIENT
Start: 2025-02-11

## 2025-02-11 NOTE — NURSING NOTE
"Chief Complaint   Patient presents with    Prenatal Care     36 w 4 d   Pt presents to clinic today for prenatal care 36w 4d. Pt denies any bleeding, or leakage of fluid at this time. States baby is moving good. Patient c/o contractions.      Initial /72   Pulse 91   Wt 61.1 kg (134 lb 11.2 oz)   LMP 06/05/2024 (Approximate)   BMI 23.86 kg/m   Estimated body mass index is 23.86 kg/m  as calculated from the following:    Height as of 11/14/24: 1.6 m (5' 3\").    Weight as of this encounter: 61.1 kg (134 lb 11.2 oz).  Medication Reconciliation: complete        Aurora Chowdary LPN    "

## 2025-02-18 ENCOUNTER — OFFICE VISIT (OUTPATIENT)
Dept: BEHAVIORAL HEALTH | Facility: OTHER | Age: 25
End: 2025-02-18
Attending: COUNSELOR
Payer: COMMERCIAL

## 2025-02-18 DIAGNOSIS — F41.1 GAD (GENERALIZED ANXIETY DISORDER): ICD-10-CM

## 2025-02-18 DIAGNOSIS — F33.41 RECURRENT MAJOR DEPRESSIVE DISORDER, IN PARTIAL REMISSION: Primary | ICD-10-CM

## 2025-02-18 DIAGNOSIS — F50.014 RESTRICTING TYPE ANOREXIA NERVOSA IN REMISSION: ICD-10-CM

## 2025-02-18 DIAGNOSIS — F90.0 ATTENTION DEFICIT HYPERACTIVITY DISORDER (ADHD), PREDOMINANTLY INATTENTIVE TYPE: ICD-10-CM

## 2025-02-18 DIAGNOSIS — F43.10 PTSD (POST-TRAUMATIC STRESS DISORDER): ICD-10-CM

## 2025-02-18 PROCEDURE — 90791 PSYCH DIAGNOSTIC EVALUATION: CPT | Performed by: COUNSELOR

## 2025-02-18 NOTE — PROGRESS NOTES
[facility] Primary Care: Integrated Behavioral Health         PATIENT'S NAME: Carol Patterson  PREFERRED NAME: Carol  PRONOUNS: She/Her  MRN: [id number]  : 2000  ADDRESS: [address] [place] [address] [address]  [place] [address]  ACCT. NUMBER:  [id number]  DATE OF SERVICE: 25  START TIME: 3:31 PM  END TIME: 4:21 PM  PREFERRED PHONE: [phone] Cell   May we leave a program related message: Yes  EMERGENCY CONTACT: was obtained     Helio Evansvin (Spouse)  [phone] (Mobile)    .  SERVICE MODALITY:  In-person    UNIVERSAL ADULT Mental Health DIAGNOSTIC ASSESSMENT    Identifying Information:  Patient is a 24 year old, Indigenous/Native      individual.  Patient was referred for an assessment by primary care provider .  Patient attended the session alone.    Chief Complaint:   The patient has a history of depression and anxiety, which she became aware of around the age of 13. 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 During her first pregnancy, she experienced anorexia, which affected her baby's weight. She reports being afraid of men, especially those with specific faces or looks, and has a strong aversion to the smell of beer.    The patient has been on various medications throughout her life but expresses a dislike for taking pills. She has not received consistent help for her depression and anxiety, as her mother dismissed her concerns. She has a history of anorexia, which she managed to improve during her pregnancy with her 's support.    ## Mental Status Examination:  Patient has attempted to resolve these concerns in the past through When to a counselor when she was 9 years old .    Social/Family History:  Patient was born in  [place]  and raised in  [place]  .  Patient has moved during childhood.  They were raised by biological mother.  Parents were not together..   Patient reported that their childhood was crap  show.  Patient described their current relationships with family of origin as not well.      The patient describes their cultural background as rural.  Cultural influences and impact on patient's life structure, values, norms, and healthcare:  NA .  Contextual influences on patient's health include: Individual Factors NA .  Cultural, Contextual, and socioeconomic factors do not affect the patient's access to services.  These factors will be addressed in the Preliminary Treatment plan.  Patient identified their preferred language to be English. Patient reported they do  need the assistance of an  or other support involved in therapy.     Patient reported had no significant delays in developmental tasks.   Patient's highest education level was some high school but no degree. Patient identified the following learning problems: attention, concentration, and reading.  Modifications will not be used to assist communication in therapy. Patient reports they are  able to understand written materials.    Patient reported the following relationship history very little dating.  Patient's current relationship status is  for 4 years.   Patient identified their sexual orientation as heterosexual.  Patient reported having one child(lars). Patient identified partner and pets as part of their support system.  Patient identified the quality of these relationships as good.     Patient's current living/housing situation involves staying in own home/apartment.  They live with patient, , roommate, and 4 year old child and they report that housing is stable.     Patient is currently on medical leave from work due to pregnancy .  Patient reports their finances are obtained through employment.  Patient does identify finances as a current stressor.      Patient reported that they have not been involved with the legal system.  Patient denies being on probation / parole / under the jurisdiction of the court.    Patient's  Strengths and Limitations:  Patient identified the following strengths or resources that will help them succeed in treatment: motivation. Things that may interfere with the patient's success in treatment include: few friends, financial hardship, lack of social support, and transportation concerns.     Assessments:  The following assessments were completed by patient for this visit:  PHQ9:       8/7/2024     2:18 PM 1/30/2025     3:04 PM 2/6/2025     2:56 PM   PHQ-9 SCORE   PHQ-9 Total Score MyChart   11 (Moderate depression)   PHQ-9 Total Score 6 18 11        Patient-reported       GAD2:       2/6/2025     2:57 PM   RAMON-2   Feeling nervous, anxious, or on edge 2   Not being able to stop or control worrying 3   RAMON-2 Total Score 5        Patient-reported     GAD7:       8/7/2024     2:18 PM 1/30/2025     3:04 PM 2/6/2025     2:57 PM   RAMON-7 SCORE   Total Score   14 (moderate anxiety)   Total Score 2 16 14        Patient-reported     CAGE-AID:       2/6/2025     2:58 PM   CAGE-AID Total Score   Total Score 0    Total Score MyChart 0 (A total score of 2 or greater is considered clinically significant)       Patient-reported     PROMIS 10-Global Health (all questions and answers displayed):       2/18/2025     3:26 PM   PROMIS 10   In general, would you say your health is: Good   In general, would you say your quality of life is: Good   In general, how would you rate your physical health? Good   In general, how would you rate your mental health, including your mood and your ability to think? Good   In general, how would you rate your satisfaction with your social activities and relationships? Fair   In general, please rate how well you carry out your usual social activities and roles Good   To what extent are you able to carry out your everyday physical activities such as walking, climbing stairs, carrying groceries, or moving a chair? A little   In the past 7 days, how often have you been bothered by emotional problems  such as feeling anxious, depressed, or irritable? Sometimes   In the past 7 days, how would you rate your fatigue on average? None   In the past 7 days, how would you rate your pain on average, where 0 means no pain, and 10 means worst imaginable pain? 0   In general, would you say your health is: 3   In general, would you say your quality of life is: 3   In general, how would you rate your physical health? 3   In general, how would you rate your mental health, including your mood and your ability to think? 3   In general, how would you rate your satisfaction with your social activities and relationships? 2   In general, please rate how well you carry out your usual social activities and roles. (This includes activities at home, at work and in your community, and responsibilities as a parent, child, spouse, employee, friend, etc.) 3   To what extent are you able to carry out your everyday physical activities such as walking, climbing stairs, carrying groceries, or moving a chair? 2   In the past 7 days, how often have you been bothered by emotional problems such as feeling anxious, depressed, or irritable? 3   In the past 7 days, how would you rate your fatigue on average? 1   In the past 7 days, how would you rate your pain on average, where 0 means no pain, and 10 means worst imaginable pain? 0   Global Mental Health Score 11    Global Physical Health Score 15    PROMIS TOTAL - SUBSCORES 26        Patient-reported     PROMIS 10-Global Health (only subscores and total score):       2/18/2025     3:26 PM   PROMIS-10 Scores Only   Global Mental Health Score 11    Global Physical Health Score 15    PROMIS TOTAL - SUBSCORES 26        Patient-reported     [place] Suicide Severity Rating Scale (Lifetime/Recent)      6/29/2024    12:12 AM 7/22/2024    10:50 AM 8/11/2024    12:05 AM 11/14/2024    12:06 PM 12/8/2024     6:15 PM 1/17/2025     9:48 PM 2/6/2025     3:04 PM   [place] Suicide Severity Rating (Lifetime/Recent)    Q1 Wished to be Dead (Past Month) 0-->no 0-->no 0-->no 0-->no 0-->no 0-->no    Q2 Suicidal Thoughts (Past Month) 0-->no 0-->no 0-->no 0-->no 0-->no 0-->no    Q6 Suicide Behavior (Lifetime) 1-->yes 0-->no 0-->no 0-->no 0-->no 0-->no    If yes to Q6, within past 3 months? 0-->no         Level of Risk per Screen moderate risk no risks indicated no risks indicated no risks indicated no risks indicated no risks indicated    1. Wish to be Dead (Lifetime)       Y   1. Wish to be Dead (Past 1 Month)       N   2. Non-Specific Active Suicidal Thoughts (Lifetime)       Y   2. Non-Specific Active Suicidal Thoughts (Past 1 Month)       N   3. Active Suicidal Ideation with any Methods (Not Plan) Without Intent to Act (Lifetime)       N   4. Active Suicidal Ideation with Some Intent to Act, Without Specific Plan (Lifetime)       N   5. Active Suicidal Ideation with Specific Plan and Intent (Lifetime)       N   Most Severe Ideation Rating (Lifetime)       2   Most Severe Ideation Rating (Past 1 Month)       2   Frequency (Lifetime)       2   Frequency (Past 1 Month)       2   Duration (Lifetime)       2   Duration (Past 1 Month)       2   Controllability (Past 1 Month)       2   Deterrents (Lifetime)       1   Deterrents (Past 1 Month)       1   Reasons for Ideation (Lifetime)       1   Reasons for Ideation (Past 1 Month)       2   Actual Attempt (Lifetime)       N   Has subject engaged in non-suicidal self-injurious behavior? (Lifetime)       Y   Interrupted Attempts (Lifetime)       N   Aborted or Self-Interrupted Attempt (Lifetime)       N   Preparatory Acts or Behavior (Lifetime)       N   Calculated C-SSRS Risk Score (Lifetime/Recent)       No Risk Indicated       Personal and Family Medical History:  Patient   report a family history of mental health concerns.  Patient reports family history includes Attention Deficit Disorder in her brother; Autism Spectrum Disorder in her brother; Diabetes Type 2  in her paternal  grandmother; Hearing Loss in her brother; No Known Problems in her father; Other - See Comments in her brother; Skin Cancer in her maternal grandfather; Von Willebrand disease in her brother..     Patient does report Mental Health Diagnosis and/or Treatment.  Patient Patient reported the following previous diagnoses which include(s): Depression.  Patient reported symptoms began 13 years of age.   Patient has received mental health services in the past:  some therapy .  Psychiatric Hospitalizations: None.  Patient denies a history of civil commitment.  Patient is receiving other mental health services.  These include  services with her PCP and [facility] .       Patient has had a physical exam to rule out medical causes for current symptoms.  Date of last physical exam was within the past year. Client was encouraged to follow up with PCP if symptoms were to develop. The patient has a [facility] Primary Care Provider, who is named No Ref-Primary, Physician..  Patient reports  pregnant .  Patient denies any issues with pain..   There are significant appetite / nutritional concerns / weight changes.   Patient does report a history of head injury / trauma / cognitive impairment.      Patient reports current meds as:   Current Outpatient Medications   Medication Sig Dispense Refill    albuterol (PROAIR HFA/PROVENTIL HFA/VENTOLIN HFA) 108 (90 Base) MCG/ACT inhaler Inhale 2 puffs into the lungs (Patient not taking: Reported on 2/11/2025)      aspirin 81 MG EC tablet Take 1 tablet (81 mg) by mouth daily. Please take once daily through pregnancy due to history of growth restriction with last pregnancy- to prevent preeclampsia 90 tablet 3    benzoyl peroxide 5 % external liquid Apply to acne daily or every other day as need for acne (Patient not taking: Reported on 2/11/2025) 118 mL 0    famotidine (PEPCID) 20 MG tablet Take 1 tablet (20 mg) by mouth 2 times daily. 30 tablet 1    hydrOXYzine HCl (ATARAX) 10 MG tablet Take 1 tablet (10  mg) by mouth 3 times daily as needed for anxiety. (Patient not taking: Reported on 2025) 30 tablet 1    hydrOXYzine catalina (VISTARIL) 25 MG capsule Take 2-4 capsules ( mg) by mouth 3 times daily as needed for other (back pain). 60 capsule 1    ondansetron (ZOFRAN ODT) 4 MG ODT tab Take 1 tablet (4 mg) by mouth every 6 hours as needed for nausea (Patient not taking: Reported on 2025) 10 tablet 0    ondansetron (ZOFRAN ODT) 4 MG ODT tab Take 1 tablet (4 mg) by mouth every 8 hours as needed for nausea (Patient not taking: Reported on 2025) 10 tablet 0     No current facility-administered medications for this visit.       Medication Adherence:  Patient reports  .  Pregnant .    Patient Allergies:    Allergies   Allergen Reactions    Cats Shortness Of Breath    Dust Mite Extract Shortness Of Breath       Medical History:    Past Medical History:   Diagnosis Date    Anemia     Encounter for triage in pregnant patient 2024    Marginal insertion of umbilical cord affecting management of mother 2020    Formatting of this note might be different from the original.   Diagnosed on 20 week fetal survey. Will need serial growth ultrasounds and consider  testing starting at 32 weeks gestational age      Nexplanon in place 2021    Formatting of this note might be different from the original.   Insert date: 3/11/2021.   Removal due: 3/11/2024.      Poor fetal growth affecting management of mother in third trimester 2021    Formatting of this note might be different from the original.   Already getting twice a week  testing with biophysical profile and NST.  Will start twice a week umbilical cord doppler studies as well.  If these remain normal, plan to induce at 39 weeks gestational age.  If umbilical cord doppler study abnormal plan to induce before then.      Uncomplicated asthma     Urinary tract infection          Current Mental Status Exam:   Appearance:  Appropriate     Eye Contact:  Good   Psychomotor:  Normal       Gait / station:  no problem  Attitude / Demeanor: Cooperative   Speech      Rate / Production: Normal/ Responsive      Volume:  Normal  volume      Language:  intact  Mood:   Normal  Affect:   Appropriate    Thought Content: Clear   Thought Process: Coherent       Associations: No loosening of associations  Insight:   Good   Judgment:  Intact   Orientation:  All  Attention/concentration: Good    Substance Use:   Patient did report a family history of substance use concerns; see medical history section for details.  Patient has not received chemical dependency treatment in the past.  Patient has not ever been to detox.      Patient is not currently receiving any chemical dependency treatment.           Substance History of use Age of first use Date of last use     Pattern and duration of use (include amounts and frequency)   Alcohol         REPORTS SUBSTANCE USE: N/A   Cannabis         REPORTS SUBSTANCE USE: N/A     Amphetamines         REPORTS SUBSTANCE USE: N/A   Cocaine/crack            REPORTS SUBSTANCE USE: N/A   Hallucinogens           REPORTS SUBSTANCE USE: N/A   Inhalants           REPORTS SUBSTANCE USE: N/A   Heroin           REPORTS SUBSTANCE USE: N/A   Other Opiates       REPORTS SUBSTANCE USE: N/A   Benzodiazepine         REPORTS SUBSTANCE USE: N/A   Barbiturates       REPORTS SUBSTANCE USE: N/A   Over the counter meds       REPORTS SUBSTANCE USE: N/A   Caffeine       Energy drinks before pregnancy   Nicotine            NA   Other substances not listed above:  Identify:        NA     Patient reported the following problems as a result of their substance use:NA  Patient reports obtaining substances from NA.    Substance Use: No symptoms    Based on the CAGE score of 0 and clinical interview there  are not indications of drug or alcohol abuse.    Significant Losses / Trauma / Abuse / Neglect Issues:   Patient   did not serve in the .  There are  indications or report of significant loss, trauma, abuse or neglect issues related to: are indications or report of significant loss, trauma, abuse or neglect issues related to family issues  Patient has not been a victim of exploitation.  Concerns for possible neglect are not present.     Safety Assessment:   Patient denies current or past homicidal ideation and behaviors.  Patient denies current/recent suicide ideation, plans, intent, or attempts but reports a history of ideation  Patient  in the past did some cutting but it was not often  Patient denied risk behaviors associated with substance use.  Patient denies any high risk behaviors associated with mental health symptoms.  Patient denied current or past personal safety concerns.    Patient denies past of current/recent assaultive behaviors.    Patient denied a history of sexual assault behaviors.     Patient reports there are not   firearms in the house.    Patient reports the following protective factors: hopeful and identifies reason for living      Risk Plan:  See Recommendations for Safety and Risk Management Plan    Review of Symptoms per patient report:   Depression: Feelings of hopelessness, Low self-worth, Difficulties concentrating, Feelings of helplessness, Ruminations, Irritability, and Withdrawn  Linnea:  No Symptoms  Psychosis: No Symptoms  Anxiety: Excessive worry, Nervousness, Separation anxiety, Social anxiety, Sleep disturbance, Psychomotor agitation, Ruminations, Poor concentration, and Irritability  Panic:  Palpitations, Shortness of breath, Tremors, Pacing, Tingling, Numbness, and Sense of impending doom  Post Traumatic Stress Disorder:  Reexperiencing of trauma, Avoids traumatic stimuli, Hypervigilance, Increased arousal, Impaired functioning, and Nightmares   Eating Disorder: Restriction  ADD / ADHD:  Inattentive, Difficulties listening, Poor task completion, Poor organizational skills, Distractibility, Forgetful, Interrupts, Intrudes,  and Impulsive  Conduct Disorder: No symptoms  Autism Spectrum Disorder: No symptoms  Obsessive Compulsive Disorder: No Symptoms  Personality Disorders:   NA    Patient reports the following compulsive behaviors and treatment history: None.      Diagnostic Criteria:   Generalized Anxiety Disorder  B. The person finds it difficult to control the worry.  C. Select 3 or more symptoms (required for diagnosis). Only one item is required in children.   - Restlessness or feeling keyed up or on edge.    - Being easily fatigued.    - Difficulty concentrating or mind going blank.    - Irritability.    - Sleep disturbance (difficulty falling or staying asleep, or restless unsatisfying sleep).   D. The focus of the anxiety and worry is not confined to features of an Axis I disorder.  E. The anxiety, worry, or physical symptoms cause clinically significant distress or impairment in social, occupational, or other important areas of functioning.   F. The disturbance is not due to the direct physiological effects of a substance (e.g., a drug of abuse, a medication) or a general medical condition (e.g., hyperthyroidism) and does not occur exclusively during a Mood Disorder, a Psychotic Disorder, or a Pervasive Developmental Disorder.    - The aformentioned symptoms began 10 year(s) ago and occurs 5 days per week and is experienced as moderate. Major Depressive Disorder  CRITERIA (A-C) REPRESENT A MAJOR DEPRESSIVE EPISODE - SELECT THESE CRITERIA  A) Recurrent episode(s) - symptoms have been present during the same 2-week period and represent a change from previous functioning 5 or more symptoms (required for diagnosis)   - Depressed mood. Note: In children and adolescents, can be irritable mood.     - Diminished interest or pleasure in all, or almost all, activities.    - Fatigue or loss of energy.    - Feelings of worthlessness or excessive guilt.    - Diminished ability to think or concentrate, or indecisiveness.    - Recurrent  thoughts of death (not just fear of dying), recurrent suicidal ideation without a specific plan, or a suicide attempt or a specific plan for committing suicide.   B) The symptoms cause clinically significant distress or impairment in social, occupational, or other important areas of functioning  C) The episode is not attributable to the physiological effects of a substance or to another medical condition  D) The occurence of major depressive episode is not better explained by other thought / psychotic disorders  E) There has never been a manic episode or hypomanic episode A.  Restriction of energy intake relative to requirements, leading to a significantly low body weight in the context of age, sex, developmental trajectory, and physical health. Significantly low body weight is defined as a weight that is less than minimally normal or, for children and adolescents, less than that minimally expected.  Attention Deficit Hyperactivity Disorder  A) A persistent pattern of inattention and/or hyperactivity-impulsivity that interferes with functioning or development, as characterized by (1) Inattention and/or (2) Hyperactivity and Impulsivity  (1) Inattention: 6 or more of the following symptoms have persisted for at least 6 months to a degree that is inconsistent with developmental level and that negatively impacts directly on social and academic/occupational activities:  - Often fails to give close attention to details or makes careless mistakes in schoolwork, at work, or during other activities  - Often has difficulty sustaining attention in tasks or play activities  - Often does not seem to listen when spoken to directly  - Often does not follow through on instructions and fails to finish schoolwork, chores, or duties in the workplace  - Often has difficulty organizing tasks and activities  - Often avoids, dislikes, or is reluctant to engage in tasks that require sustained mental effort  - Often loses things necessary  for tasks or activities  - Is often easily distractedby extraneous stimuli  - Is often forgetful in daily activities  - Often fidgets with or taps hands or feet or squirms in seat  - Often leaves seat in situations when remaining seated is expected Post- Traumatic Stress Disorder  A. The person has been exposed to a traumatic event in which both of the following were present:     (1) the person experienced, witnessed, or was confronted with an event or events that involved actual or threatened death or serious injury, or a threat to the physical integrity of self or others     (2) the person's response involved intense fear, helplessness, or horror. Note: In children, this may be expressed instead by disorganized or agitated behavior  B. The traumatic event is persistently reexperienced in one (or more) of the following ways:     - Recurrent and intrusive distressing recollections of the event, including images, thoughts, or perceptions. Note: In young children, repetitive play may occur in which themes or aspects of the trauma are expressed.      - Recurrent distressing dreams of the event. Note: In children, there may be frightening dreams without recognizable content.      - Acting or feeling as if the traumatic event were recurring (includes a sense of reliving the experience, illusions, hallucinations, and dissociative flashback episodes, including those that occur on awakening or when intoxicated). Note: In young children, trauma-specific reenactment may occur.      - Intense psychological distress at exposure to internal or external cues that symbolize or resemble an aspect of the traumatic event.      - Physiological reactivity on exposure to internal or external cues that symbolize or resemble an aspect of the traumatic event.   C. Persistent avoidance of stimuli associated with the trauma and numbing of general responsiveness (not present before the trauma), as indicated by three (or more) of the  following:     - Efforts to avoid thoughts, feelings, or conversations associated with the trauma.      - Efforts to avoid activities, places, or people that arouse recollections of the trauma.      - Inability to recall an important aspect of the trauma.      - Markedly diminished interest or participation in significant activities.      - Feeling of detachment or estrangement from others.   D. Persistent symptoms of increased arousal (not present before the trauma), as indicated by two (or more) of the following:     - Difficulty falling or staying asleep.   E. Duration of the disturbance is more than 1 month.  F. The disturbance causes clinically significant distress or impairment in social, occupational, or other important areas of functioning.    Functional Status:  Patient reports the following functional impairments:  academic performance, childcare / parenting, and work / vocational responsibilities.     Nonprogrammatic care:  Patient is requesting basic services to address current mental health concerns.    Clinical Summary:  Psychosocial Factors:  Medical complexities, Trauma history, Substance use history/concerns, Relationship concerns, Educational Issues, Occupational Issues.  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    Prognosis: Return to Baseline Functioning and Expect Improvement.  Likely consequences of symptoms if not treated: Worsening of Symptoms.  Patient  strengths include:  caring and open to learning .     Recommendations:     Plan for Safety and Risk Management:   Safety and Risk: Recommended that patient call 911 or go to the local ED should there be a change in any of these risk factors        Report to child / adult protection services was NA.      Patient's identified  NA .     Initial Treatment will focus on:     Depressed Mood -    Anxiety -   .     Resources/Service Plan:    services are not indicated.   Modifications to assist communication are not indicated.   Additional disability accommodations are not indicated.      Collaboration:   Collaboration / coordination of treatment will be initiated with the following  support professionals:  NA .      Referrals:   The following referral(s) will be initiated:  NA .       A Release of Information has been obtained for the following:  NA .     Clinical Substantiation/medical necessity for the above recommendations:  Medically necessary based on the individuals presenting symptoms history and current functional impairment which require therapy to support their health and safety and well being. .    ARI:  None    Records:    These were reviewed at time of assessment.Information in this assessment was obtained from the medical record and provided by patient who is a fair historian.    Patient will have open access to their mental health medical record.    Interactive Complexity: No    Safety Plan: No Safety plan indicated    Provider Name/ Credentials:  Troy Greco [facility]  February 18, 2025

## 2025-02-19 ENCOUNTER — PRENATAL OFFICE VISIT (OUTPATIENT)
Dept: OBGYN | Facility: OTHER | Age: 25
End: 2025-02-19
Attending: OBSTETRICS & GYNECOLOGY
Payer: COMMERCIAL

## 2025-02-19 VITALS
WEIGHT: 134.7 LBS | HEART RATE: 100 BPM | SYSTOLIC BLOOD PRESSURE: 122 MMHG | BODY MASS INDEX: 23.86 KG/M2 | DIASTOLIC BLOOD PRESSURE: 64 MMHG

## 2025-02-19 DIAGNOSIS — O99.891 BACK PAIN IN PREGNANCY: ICD-10-CM

## 2025-02-19 DIAGNOSIS — M54.9 BACK PAIN IN PREGNANCY: ICD-10-CM

## 2025-02-19 DIAGNOSIS — Z34.93 ENCOUNTER FOR PREGNANCY RELATED EXAMINATION IN THIRD TRIMESTER: Primary | ICD-10-CM

## 2025-02-19 DIAGNOSIS — O26.843 UTERINE SIZE DATE DISCREPANCY PREGNANCY, THIRD TRIMESTER: ICD-10-CM

## 2025-02-19 ASSESSMENT — PAIN SCALES - GENERAL: PAINLEVEL_OUTOF10: NO PAIN (0)

## 2025-02-19 NOTE — NURSING NOTE
Chief Complaint   Patient presents with    Prenatal Care     37w5d       Pt presents to clinic today for prenatal care 37w5d. Pt denies any bleeding, or leakage of fluid at this time. States baby is moving good. Patient stares she has been having contractions but nothing regular. Patient states that she occasionally gets a piercing pain by her tail bone that usually last about 30 minutes.     Rohini Lemon LPN on 2/19/2025 at 1:30 PM
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
## 2025-02-25 ENCOUNTER — PRENATAL OFFICE VISIT (OUTPATIENT)
Dept: OBGYN | Facility: OTHER | Age: 25
End: 2025-02-25
Attending: OBSTETRICS & GYNECOLOGY
Payer: COMMERCIAL

## 2025-02-25 VITALS
WEIGHT: 136 LBS | HEART RATE: 88 BPM | SYSTOLIC BLOOD PRESSURE: 130 MMHG | DIASTOLIC BLOOD PRESSURE: 72 MMHG | BODY MASS INDEX: 24.09 KG/M2

## 2025-02-25 DIAGNOSIS — H53.8 BLURRED VISION: ICD-10-CM

## 2025-02-25 DIAGNOSIS — O36.8131 DECREASED FETAL MOVEMENTS IN THIRD TRIMESTER, FETUS 1 OF MULTIPLE GESTATION: ICD-10-CM

## 2025-02-25 DIAGNOSIS — Z34.93 ENCOUNTER FOR PREGNANCY RELATED EXAMINATION IN THIRD TRIMESTER: Primary | ICD-10-CM

## 2025-02-25 PROCEDURE — 59025 FETAL NON-STRESS TEST: CPT | Performed by: NURSE PRACTITIONER

## 2025-02-25 PROCEDURE — G0463 HOSPITAL OUTPT CLINIC VISIT: HCPCS | Mod: 25

## 2025-02-25 ASSESSMENT — PATIENT HEALTH QUESTIONNAIRE - PHQ9
10. IF YOU CHECKED OFF ANY PROBLEMS, HOW DIFFICULT HAVE THESE PROBLEMS MADE IT FOR YOU TO DO YOUR WORK, TAKE CARE OF THINGS AT HOME, OR GET ALONG WITH OTHER PEOPLE: SOMEWHAT DIFFICULT
SUM OF ALL RESPONSES TO PHQ QUESTIONS 1-9: 7
SUM OF ALL RESPONSES TO PHQ QUESTIONS 1-9: 7

## 2025-02-25 ASSESSMENT — PAIN SCALES - GENERAL: PAINLEVEL_OUTOF10: NO PAIN (0)

## 2025-02-25 NOTE — NURSING NOTE
Chief Complaint   Patient presents with    Prenatal Care     38w4d       Medication Reconciliation: complete    Pt presents to clinic today for prenatal care. Pt denies any bleeding, or leakage of fluid at this time. States baby is not moving as much as usual. Patient denies any regular contractions.      Azra Mcginnis LPN........................2/25/2025  2:57 PM
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
## 2025-02-26 ENCOUNTER — MYC MEDICAL ADVICE (OUTPATIENT)
Dept: OBGYN | Facility: OTHER | Age: 25
End: 2025-02-26
Payer: COMMERCIAL

## 2025-02-26 NOTE — TELEPHONE ENCOUNTER
Spoke with patient who is unable to come in this afternoon for ultrasound. Contacted scheduling to assist with finding a better time for her. She would like to be contacted on her significant others phone [phone].  Cassie Whitley RN on 2/26/2025 at 10:43 AM

## 2025-02-26 NOTE — TELEPHONE ENCOUNTER
[phone], her phone is not working, using spouses phone number       Yaquelin Fox on 2/26/2025 at 10:35 AM

## 2025-03-07 ENCOUNTER — PRENATAL OFFICE VISIT (OUTPATIENT)
Dept: OBGYN | Facility: OTHER | Age: 25
End: 2025-03-07
Attending: OBSTETRICS & GYNECOLOGY
Payer: COMMERCIAL

## 2025-03-07 ENCOUNTER — HOSPITAL ENCOUNTER (OUTPATIENT)
Facility: OTHER | Age: 25
Discharge: HOME | End: 2025-03-07
Attending: OBSTETRICS & GYNECOLOGY | Admitting: OBSTETRICS & GYNECOLOGY
Payer: COMMERCIAL

## 2025-03-07 VITALS
DIASTOLIC BLOOD PRESSURE: 62 MMHG | BODY MASS INDEX: 24.53 KG/M2 | TEMPERATURE: 98.7 F | OXYGEN SATURATION: 97 % | RESPIRATION RATE: 16 BRPM | WEIGHT: 138.5 LBS | HEART RATE: 80 BPM | SYSTOLIC BLOOD PRESSURE: 128 MMHG

## 2025-03-07 VITALS
HEART RATE: 75 BPM | OXYGEN SATURATION: 98 % | DIASTOLIC BLOOD PRESSURE: 69 MMHG | RESPIRATION RATE: 18 BRPM | SYSTOLIC BLOOD PRESSURE: 126 MMHG | TEMPERATURE: 97.7 F

## 2025-03-07 DIAGNOSIS — N89.8 VAGINAL DISCHARGE: Primary | ICD-10-CM

## 2025-03-07 DIAGNOSIS — N89.8 VAGINAL DISCHARGE: ICD-10-CM

## 2025-03-07 DIAGNOSIS — Z3A.40 40 WEEKS GESTATION OF PREGNANCY: ICD-10-CM

## 2025-03-07 DIAGNOSIS — O09.93 SUPERVISION OF HIGH RISK PREGNANCY IN THIRD TRIMESTER: Primary | ICD-10-CM

## 2025-03-07 DIAGNOSIS — O99.013 ANEMIA AFFECTING PREGNANCY IN THIRD TRIMESTER: ICD-10-CM

## 2025-03-07 LAB — RUPTURE OF FETAL MEMBRANES BY ROM PLUS: NEGATIVE

## 2025-03-07 PROCEDURE — 99207 PR OB VISIT-NO CHARGE - GICH ONLY: CPT | Performed by: OBSTETRICS & GYNECOLOGY

## 2025-03-07 PROCEDURE — 84112 EVAL AMNIOTIC FLUID PROTEIN: CPT | Performed by: OBSTETRICS & GYNECOLOGY

## 2025-03-07 PROCEDURE — G0463 HOSPITAL OUTPT CLINIC VISIT: HCPCS

## 2025-03-07 RX ORDER — LIDOCAINE 40 MG/G
CREAM TOPICAL
Status: DISCONTINUED | OUTPATIENT
Start: 2025-03-07 | End: 2025-03-07 | Stop reason: HOSPADM

## 2025-03-07 ASSESSMENT — ACTIVITIES OF DAILY LIVING (ADL)
ADLS_ACUITY_SCORE: 15
ADLS_ACUITY_SCORE: 15

## 2025-03-07 ASSESSMENT — PAIN SCALES - GENERAL: PAINLEVEL_OUTOF10: NO PAIN (0)

## 2025-03-07 NOTE — PROGRESS NOTES
"Data: Patient presented to [facility]: 3/7/2025  4:27 PM.  Reason for maternal/fetal assessment is uterine contractions and leaking vaginal fluid. Patient reports 5/10 contraction pain and \"watery discharge.\" Patient denies vaginal bleeding, pelvic pressure, nausea, vomiting, headache, visual disturbances, epigastric or RUQ pain, significant edema. Patient reports fetal movement is normal. Patient is a 40w0d . Prenatal record reviewed. Pregnancy has been uncomplicated. Support person is present.     Fetal HR baseline was 130, variability is moderate (amplitude range 6 to 25 bpm). Accelerations: increase 15 bpm above baseline lasting 15 seconds. Decelerations: absent. Uterine assessment is moderate by palpation during contractions and soft by palpation at rest. Cervix 2.5 cm dilated and 60% effaced. Fetal station -2. Fetal presentation cephalic per cervical exam. Membranes: ROM Plus test pending.    Vital signs wnl. Patient reports pain and is coping.     Action: Verbal consent for EFM. Triage assessment completed. Patient does not meet criteria for early labor discharge.     Response: Patient verbalized agreement with plan. Will contact Dr. Willis with update and for further orders.    Jaquan Mariscal RN    "

## 2025-03-07 NOTE — NURSING NOTE
"Chief Complaint   Patient presents with    Prenatal Care     40 weeks 0 days        Initial /62   Pulse 80   Temp 98.7  F (37.1  C) (Oral)   Resp 16   Wt 62.8 kg (138 lb 8 oz)   LMP 06/05/2024 (Approximate)   SpO2 97%   Breastfeeding No   BMI 24.53 kg/m   Estimated body mass index is 24.53 kg/m  as calculated from the following:    Height as of 11/14/24: 1.6 m (5' 3\").    Weight as of this encounter: 62.8 kg (138 lb 8 oz).  Medication Reconciliation: complete    Alethea Taylor CMA    "

## 2025-03-08 NOTE — PROGRESS NOTES
Patient admitted to THC use during pregnancy. Unable to collect urine before discharge............Jaquan Mariscal RN

## 2025-03-08 NOTE — PROGRESS NOTES
RN let patient know her ROM+ came back negative. Patient is randa every 2-4 minutes. RN offered to recheck cervix right now or around 1900. Patient declined and would like to go home. Dr. Willis aware and is OK with sending patient home. Patient given instructions on when to call/return to the hospital. Patient in agreement with plan. Category 1 strip.    Temp: 97.7  F (36.5  C) Temp src: Temporal BP: 126/69 Pulse: 75   Resp: 18 SpO2: 98 % O2 Device: None (Room air)      Jaquan Mariscal RN

## 2025-03-10 ENCOUNTER — HOSPITAL ENCOUNTER (OUTPATIENT)
Facility: OTHER | Age: 25
Discharge: HOME | End: 2025-03-10
Attending: OBSTETRICS & GYNECOLOGY | Admitting: OBSTETRICS & GYNECOLOGY
Payer: COMMERCIAL

## 2025-03-10 ENCOUNTER — TELEPHONE (OUTPATIENT)
Dept: OBGYN | Facility: OTHER | Age: 25
End: 2025-03-10
Payer: COMMERCIAL

## 2025-03-10 VITALS — SYSTOLIC BLOOD PRESSURE: 126 MMHG | RESPIRATION RATE: 16 BRPM | DIASTOLIC BLOOD PRESSURE: 62 MMHG | TEMPERATURE: 97.5 F

## 2025-03-10 LAB — RUPTURE OF FETAL MEMBRANES BY ROM PLUS: NEGATIVE

## 2025-03-10 PROCEDURE — G0463 HOSPITAL OUTPT CLINIC VISIT: HCPCS | Mod: 25

## 2025-03-10 PROCEDURE — 84112 EVAL AMNIOTIC FLUID PROTEIN: CPT | Performed by: OBSTETRICS & GYNECOLOGY

## 2025-03-10 PROCEDURE — G0463 HOSPITAL OUTPT CLINIC VISIT: HCPCS

## 2025-03-10 RX ORDER — LIDOCAINE 40 MG/G
CREAM TOPICAL
Status: DISCONTINUED | OUTPATIENT
Start: 2025-03-10 | End: 2025-03-10 | Stop reason: HOSPADM

## 2025-03-10 ASSESSMENT — ACTIVITIES OF DAILY LIVING (ADL)
ADLS_ACUITY_SCORE: 41
ADLS_ACUITY_SCORE: 15

## 2025-03-10 NOTE — PROGRESS NOTES
Data: Patient assessed in the [facility] for decreased fetal movement and leaking vaginal fluid. Cervix 2 cm dilated and 50% effaced . Membranes intact. Contractions are present. Contactions are  , 5 to 6 minutes apart, and last 50 to 60 seconds. Uterine assessment is   during contractions and   at rest. See flowsheets for fetal assessment documentation.     Action: Presumed adequate fetal oxygenation documented. Discharge instructions reviewed. Patient instructed to report change in fetal movement, vaginal leaking of fluid or bleeding, abdominal pain, or any concerns related to the pregnancy to provider/clinic.      Response: Orders to discharge home per Dr. Perry. Patient verbalized understanding of education and agreement with plan. Discharged to home at 1315.

## 2025-03-10 NOTE — TELEPHONE ENCOUNTER
S-(situation): Patient called wondering if she could be induced and informed writer that she might be leaking fluid and she is having decreased fetal movement.      B-(background): Patient is 40w3d pregnant     A-(assessment): Patient stated that she has been having discomfort since Friday. Reports that she lost her mucus plug and is having a lot of contractions that are coming every couple of minutes. Reports that she is unsure if her water has broken or not as she has a large gush of fluid while going to the bathroom this AM. Also reported that she has had decreased fetal movement.         R-(recommendations): Patient was given the recommendations to come into [facility] for evaluation. Patient verbalized understanding and had no questions at this time.   Patient was unsure how long it would be until she was able to come in. Patient informed the writer would update [facility] that she would be coming in. Patient verbalized understanding and had no questions at this time. Call placed to [facility] to update them that patient would be coming.     Kaitlin Valenzuela RN on 3/10/2025 at 11:44 AM

## 2025-03-10 NOTE — PROGRESS NOTES
Data: Patient presented to [facility]: 3/10/2025 11:45 AM.  Reason for maternal/fetal assessment is decreased fetal movement and leaking vaginal fluid. Patient reports losing her mucous plug and possibly leaking amniotic fluid, but states she is unsure. Patient denies nausea, vomiting, headache, visual disturbances, significant edema. Patient reports fetal movement is decreased. Patient is a 40w3d .  Prenatal record reviewed. Pregnancy has been uncomplicated.    Vital signs wnl. Support person is not present.     Action: Verbal consent for EFM. Triage assessment completed.     Response: Patient verbalized agreement with plan. Will contact Dr. Perry with update and further orders.

## 2025-03-11 ENCOUNTER — PRENATAL OFFICE VISIT (OUTPATIENT)
Dept: OBGYN | Facility: OTHER | Age: 25
End: 2025-03-11
Attending: OBSTETRICS & GYNECOLOGY
Payer: COMMERCIAL

## 2025-03-11 VITALS
SYSTOLIC BLOOD PRESSURE: 120 MMHG | HEART RATE: 80 BPM | RESPIRATION RATE: 18 BRPM | WEIGHT: 138.2 LBS | DIASTOLIC BLOOD PRESSURE: 72 MMHG | BODY MASS INDEX: 24.48 KG/M2

## 2025-03-11 DIAGNOSIS — Z34.90 ENCOUNTER FOR PLANNED INDUCTION OF LABOR: Primary | ICD-10-CM

## 2025-03-11 PROCEDURE — G0463 HOSPITAL OUTPT CLINIC VISIT: HCPCS

## 2025-03-11 RX ORDER — ONDANSETRON 2 MG/ML
4 INJECTION INTRAMUSCULAR; INTRAVENOUS EVERY 6 HOURS PRN
Status: CANCELLED | OUTPATIENT
Start: 2025-03-11

## 2025-03-11 RX ORDER — SODIUM CHLORIDE, SODIUM LACTATE, POTASSIUM CHLORIDE, CALCIUM CHLORIDE 600; 310; 30; 20 MG/100ML; MG/100ML; MG/100ML; MG/100ML
INJECTION, SOLUTION INTRAVENOUS CONTINUOUS
Status: CANCELLED | OUTPATIENT
Start: 2025-03-11

## 2025-03-11 RX ORDER — MISOPROSTOL 200 UG/1
400 TABLET ORAL
Status: CANCELLED | OUTPATIENT
Start: 2025-03-11

## 2025-03-11 RX ORDER — HYDROXYZINE HYDROCHLORIDE 25 MG/1
100 TABLET, FILM COATED ORAL
Status: CANCELLED | OUTPATIENT
Start: 2025-03-11

## 2025-03-11 RX ORDER — CARBOPROST TROMETHAMINE 250 UG/ML
250 INJECTION, SOLUTION INTRAMUSCULAR
Status: CANCELLED | OUTPATIENT
Start: 2025-03-11

## 2025-03-11 RX ORDER — METOCLOPRAMIDE HYDROCHLORIDE 5 MG/ML
10 INJECTION INTRAMUSCULAR; INTRAVENOUS EVERY 6 HOURS PRN
Status: CANCELLED | OUTPATIENT
Start: 2025-03-11

## 2025-03-11 RX ORDER — TRANEXAMIC ACID 10 MG/ML
1 INJECTION, SOLUTION INTRAVENOUS EVERY 30 MIN PRN
Status: CANCELLED | OUTPATIENT
Start: 2025-03-11

## 2025-03-11 RX ORDER — ONDANSETRON 4 MG/1
4 TABLET, ORALLY DISINTEGRATING ORAL EVERY 6 HOURS PRN
Status: CANCELLED | OUTPATIENT
Start: 2025-03-11

## 2025-03-11 RX ORDER — MISOPROSTOL 100 UG/1
25 TABLET ORAL
Status: CANCELLED | OUTPATIENT
Start: 2025-03-11

## 2025-03-11 RX ORDER — PROCHLORPERAZINE MALEATE 10 MG
10 TABLET ORAL EVERY 6 HOURS PRN
Status: CANCELLED | OUTPATIENT
Start: 2025-03-11

## 2025-03-11 RX ORDER — LIDOCAINE 40 MG/G
CREAM TOPICAL
Status: CANCELLED | OUTPATIENT
Start: 2025-03-11

## 2025-03-11 RX ORDER — LOPERAMIDE HYDROCHLORIDE 2 MG/1
2 CAPSULE ORAL
Status: CANCELLED | OUTPATIENT
Start: 2025-03-11

## 2025-03-11 RX ORDER — CITRIC ACID/SODIUM CITRATE 334-500MG
30 SOLUTION, ORAL ORAL
Status: CANCELLED | OUTPATIENT
Start: 2025-03-11

## 2025-03-11 RX ORDER — OXYTOCIN 10 [USP'U]/ML
10 INJECTION, SOLUTION INTRAMUSCULAR; INTRAVENOUS
Status: CANCELLED | OUTPATIENT
Start: 2025-03-11

## 2025-03-11 RX ORDER — ACETAMINOPHEN 325 MG/1
650 TABLET ORAL EVERY 4 HOURS PRN
Status: CANCELLED | OUTPATIENT
Start: 2025-03-11

## 2025-03-11 RX ORDER — METHYLERGONOVINE MALEATE 0.2 MG/ML
200 INJECTION INTRAVENOUS
Status: CANCELLED | OUTPATIENT
Start: 2025-03-11

## 2025-03-11 RX ORDER — OXYTOCIN/0.9 % SODIUM CHLORIDE 30/500 ML
340 PLASTIC BAG, INJECTION (ML) INTRAVENOUS CONTINUOUS PRN
Status: CANCELLED | OUTPATIENT
Start: 2025-03-11

## 2025-03-11 RX ORDER — CITRIC ACID/SODIUM CITRATE 334-500MG
30 SOLUTION, ORAL ORAL ONCE
Status: CANCELLED | OUTPATIENT
Start: 2025-03-11 | End: 2025-03-11

## 2025-03-11 RX ORDER — KETOROLAC TROMETHAMINE 30 MG/ML
15 INJECTION, SOLUTION INTRAMUSCULAR; INTRAVENOUS
Status: CANCELLED | OUTPATIENT
Start: 2025-03-11

## 2025-03-11 RX ORDER — IBUPROFEN 200 MG
800 TABLET ORAL
Status: CANCELLED | OUTPATIENT
Start: 2025-03-11

## 2025-03-11 RX ORDER — METOCLOPRAMIDE 10 MG/1
10 TABLET ORAL EVERY 6 HOURS PRN
Status: CANCELLED | OUTPATIENT
Start: 2025-03-11

## 2025-03-11 RX ORDER — OXYTOCIN/0.9 % SODIUM CHLORIDE 30/500 ML
100-340 PLASTIC BAG, INJECTION (ML) INTRAVENOUS CONTINUOUS PRN
Status: CANCELLED | OUTPATIENT
Start: 2025-03-11

## 2025-03-11 RX ORDER — LOPERAMIDE HYDROCHLORIDE 2 MG/1
4 CAPSULE ORAL
Status: CANCELLED | OUTPATIENT
Start: 2025-03-11

## 2025-03-11 ASSESSMENT — PAIN SCALES - GENERAL: PAINLEVEL_OUTOF10: MODERATE PAIN (4)
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
## 2025-03-12 ENCOUNTER — ANESTHESIA EVENT (OUTPATIENT)
Dept: OBGYN | Facility: OTHER | Age: 25
End: 2025-03-12
Payer: COMMERCIAL

## 2025-03-12 ENCOUNTER — HOSPITAL ENCOUNTER (INPATIENT)
Facility: OTHER | Age: 25
LOS: 2 days | Discharge: HOME | End: 2025-03-14
Attending: STUDENT IN AN ORGANIZED HEALTH CARE EDUCATION/TRAINING PROGRAM | Admitting: STUDENT IN AN ORGANIZED HEALTH CARE EDUCATION/TRAINING PROGRAM
Payer: COMMERCIAL

## 2025-03-12 ENCOUNTER — ANESTHESIA (OUTPATIENT)
Dept: OBGYN | Facility: OTHER | Age: 25
End: 2025-03-12
Payer: COMMERCIAL

## 2025-03-12 DIAGNOSIS — Z34.93 THIRD TRIMESTER PREGNANCY: Primary | ICD-10-CM

## 2025-03-12 DIAGNOSIS — Z36.89 ENCOUNTER FOR TRIAGE IN PREGNANT PATIENT: ICD-10-CM

## 2025-03-12 DIAGNOSIS — F41.9 ANXIETY: ICD-10-CM

## 2025-03-12 DIAGNOSIS — Z34.90 ENCOUNTER FOR INDUCTION OF LABOR: ICD-10-CM

## 2025-03-12 DIAGNOSIS — Z34.90 ENCOUNTER FOR PLANNED INDUCTION OF LABOR: ICD-10-CM

## 2025-03-12 LAB
ABO + RH BLD: NORMAL
BLD GP AB SCN SERPL QL: NEGATIVE
BLD PROD TYP BPU: NORMAL
BLD PROD TYP BPU: NORMAL
BLOOD COMPONENT TYPE: NORMAL
BLOOD COMPONENT TYPE: NORMAL
CODING SYSTEM: NORMAL
CODING SYSTEM: NORMAL
CROSSMATCH: NORMAL
CROSSMATCH: NORMAL
ERYTHROCYTE [DISTWIDTH] IN BLOOD BY AUTOMATED COUNT: 16.5 % (ref 10–15)
HCT VFR BLD AUTO: 27 % (ref 35–47)
HGB BLD-MCNC: 8.4 G/DL (ref 11.7–15.7)
MCH RBC QN AUTO: 25 PG (ref 26.5–33)
MCHC RBC AUTO-ENTMCNC: 31.1 G/DL (ref 31.5–36.5)
MCV RBC AUTO: 80 FL (ref 78–100)
PLATELET # BLD AUTO: 388 10E3/UL (ref 150–450)
RBC # BLD AUTO: 3.36 10E6/UL (ref 3.8–5.2)
SPECIMEN EXP DATE BLD: NORMAL
UNIT ABO/RH: NORMAL
UNIT ABO/RH: NORMAL
UNIT NUMBER: NORMAL
UNIT NUMBER: NORMAL
UNIT STATUS: NORMAL
UNIT STATUS: NORMAL
UNIT TYPE ISBT: 6200
UNIT TYPE ISBT: 6200
WBC # BLD AUTO: 11.2 10E3/UL (ref 4–11)

## 2025-03-12 PROCEDURE — 258N000003 HC RX IP 258 OP 636: Performed by: STUDENT IN AN ORGANIZED HEALTH CARE EDUCATION/TRAINING PROGRAM

## 2025-03-12 PROCEDURE — 250N000011 HC RX IP 250 OP 636: Performed by: STUDENT IN AN ORGANIZED HEALTH CARE EDUCATION/TRAINING PROGRAM

## 2025-03-12 PROCEDURE — 250N000011 HC RX IP 250 OP 636

## 2025-03-12 PROCEDURE — 258N000003 HC RX IP 258 OP 636: Performed by: OBSTETRICS & GYNECOLOGY

## 2025-03-12 PROCEDURE — 250N000009 HC RX 250: Performed by: STUDENT IN AN ORGANIZED HEALTH CARE EDUCATION/TRAINING PROGRAM

## 2025-03-12 PROCEDURE — 250N000009 HC RX 250

## 2025-03-12 PROCEDURE — 86901 BLOOD TYPING SEROLOGIC RH(D): CPT | Performed by: OBSTETRICS & GYNECOLOGY

## 2025-03-12 PROCEDURE — 85014 HEMATOCRIT: CPT | Performed by: OBSTETRICS & GYNECOLOGY

## 2025-03-12 PROCEDURE — 86923 COMPATIBILITY TEST ELECTRIC: CPT | Performed by: STUDENT IN AN ORGANIZED HEALTH CARE EDUCATION/TRAINING PROGRAM

## 2025-03-12 PROCEDURE — 86780 TREPONEMA PALLIDUM: CPT | Performed by: OBSTETRICS & GYNECOLOGY

## 2025-03-12 PROCEDURE — 120N000001 HC R&B MED SURG/OB

## 2025-03-12 PROCEDURE — 370N000003 HC ANESTHESIA WARD SERVICE

## 2025-03-12 PROCEDURE — 250N000013 HC RX MED GY IP 250 OP 250 PS 637

## 2025-03-12 RX ORDER — CARBOPROST TROMETHAMINE 250 UG/ML
250 INJECTION, SOLUTION INTRAMUSCULAR
Status: DISCONTINUED | OUTPATIENT
Start: 2025-03-12 | End: 2025-03-13 | Stop reason: HOSPADM

## 2025-03-12 RX ORDER — SODIUM CHLORIDE, SODIUM LACTATE, POTASSIUM CHLORIDE, CALCIUM CHLORIDE 600; 310; 30; 20 MG/100ML; MG/100ML; MG/100ML; MG/100ML
INJECTION, SOLUTION INTRAVENOUS CONTINUOUS
Status: DISCONTINUED | OUTPATIENT
Start: 2025-03-12 | End: 2025-03-13 | Stop reason: HOSPADM

## 2025-03-12 RX ORDER — LOPERAMIDE HYDROCHLORIDE 2 MG/1
2 CAPSULE ORAL
Status: DISCONTINUED | OUTPATIENT
Start: 2025-03-12 | End: 2025-03-13 | Stop reason: HOSPADM

## 2025-03-12 RX ORDER — MORPHINE SULFATE 10 MG/ML
10 INJECTION, SOLUTION INTRAMUSCULAR; INTRAVENOUS
Status: DISCONTINUED | OUTPATIENT
Start: 2025-03-12 | End: 2025-03-13 | Stop reason: HOSPADM

## 2025-03-12 RX ORDER — HYDROXYZINE HYDROCHLORIDE 25 MG/1
50 TABLET, FILM COATED ORAL EVERY 6 HOURS PRN
Status: DISCONTINUED | OUTPATIENT
Start: 2025-03-12 | End: 2025-03-13

## 2025-03-12 RX ORDER — PENICILLIN G 3000000 [IU]/50ML
3 INJECTION, SOLUTION INTRAVENOUS EVERY 4 HOURS
Status: DISCONTINUED | OUTPATIENT
Start: 2025-03-13 | End: 2025-03-13

## 2025-03-12 RX ORDER — ONDANSETRON 4 MG/1
4 TABLET, ORALLY DISINTEGRATING ORAL EVERY 6 HOURS PRN
Status: DISCONTINUED | OUTPATIENT
Start: 2025-03-12 | End: 2025-03-12

## 2025-03-12 RX ORDER — CITRIC ACID/SODIUM CITRATE 334-500MG
30 SOLUTION, ORAL ORAL ONCE
Status: DISCONTINUED | OUTPATIENT
Start: 2025-03-12 | End: 2025-03-12

## 2025-03-12 RX ORDER — NALOXONE HYDROCHLORIDE 0.4 MG/ML
0.2 INJECTION, SOLUTION INTRAMUSCULAR; INTRAVENOUS; SUBCUTANEOUS
Status: DISCONTINUED | OUTPATIENT
Start: 2025-03-12 | End: 2025-03-13

## 2025-03-12 RX ORDER — OXYTOCIN 10 [USP'U]/ML
10 INJECTION, SOLUTION INTRAMUSCULAR; INTRAVENOUS
Status: DISCONTINUED | OUTPATIENT
Start: 2025-03-12 | End: 2025-03-13 | Stop reason: HOSPADM

## 2025-03-12 RX ORDER — LIDOCAINE HYDROCHLORIDE 20 MG/ML
JELLY TOPICAL
Status: COMPLETED | OUTPATIENT
Start: 2025-03-12 | End: 2025-03-12

## 2025-03-12 RX ORDER — METHYLERGONOVINE MALEATE 0.2 MG/ML
200 INJECTION INTRAVENOUS
Status: DISCONTINUED | OUTPATIENT
Start: 2025-03-12 | End: 2025-03-13 | Stop reason: HOSPADM

## 2025-03-12 RX ORDER — ONDANSETRON 2 MG/ML
4 INJECTION INTRAMUSCULAR; INTRAVENOUS EVERY 6 HOURS PRN
Status: DISCONTINUED | OUTPATIENT
Start: 2025-03-12 | End: 2025-03-12

## 2025-03-12 RX ORDER — METOCLOPRAMIDE HYDROCHLORIDE 5 MG/ML
10 INJECTION INTRAMUSCULAR; INTRAVENOUS EVERY 6 HOURS PRN
Status: DISCONTINUED | OUTPATIENT
Start: 2025-03-12 | End: 2025-03-13 | Stop reason: HOSPADM

## 2025-03-12 RX ORDER — LIDOCAINE HYDROCHLORIDE 10 MG/ML
INJECTION, SOLUTION INFILTRATION; PERINEURAL PRN
Status: DISCONTINUED | OUTPATIENT
Start: 2025-03-12 | End: 2025-03-13

## 2025-03-12 RX ORDER — FENTANYL CITRATE-0.9 % NACL/PF 10 MCG/ML
100 PLASTIC BAG, INJECTION (ML) INTRAVENOUS EVERY 5 MIN PRN
Status: DISCONTINUED | OUTPATIENT
Start: 2025-03-12 | End: 2025-03-13 | Stop reason: HOSPADM

## 2025-03-12 RX ORDER — MORPHINE SULFATE 10 MG/ML
10 INJECTION, SOLUTION INTRAMUSCULAR; INTRAVENOUS ONCE
Status: COMPLETED | OUTPATIENT
Start: 2025-03-12 | End: 2025-03-13

## 2025-03-12 RX ORDER — PENICILLIN G POTASSIUM 5000000 [IU]/1
5 INJECTION, POWDER, FOR SOLUTION INTRAMUSCULAR; INTRAVENOUS ONCE
Status: COMPLETED | OUTPATIENT
Start: 2025-03-12 | End: 2025-03-12

## 2025-03-12 RX ORDER — PROCHLORPERAZINE MALEATE 10 MG
10 TABLET ORAL EVERY 6 HOURS PRN
Status: DISCONTINUED | OUTPATIENT
Start: 2025-03-12 | End: 2025-03-13 | Stop reason: HOSPADM

## 2025-03-12 RX ORDER — TRANEXAMIC ACID 10 MG/ML
1 INJECTION, SOLUTION INTRAVENOUS EVERY 30 MIN PRN
Status: DISCONTINUED | OUTPATIENT
Start: 2025-03-12 | End: 2025-03-13 | Stop reason: HOSPADM

## 2025-03-12 RX ORDER — FENTANYL CITRATE 50 UG/ML
50 INJECTION, SOLUTION INTRAMUSCULAR; INTRAVENOUS EVERY 30 MIN PRN
Status: DISCONTINUED | OUTPATIENT
Start: 2025-03-12 | End: 2025-03-13 | Stop reason: HOSPADM

## 2025-03-12 RX ORDER — KETOROLAC TROMETHAMINE 15 MG/ML
15 INJECTION, SOLUTION INTRAMUSCULAR; INTRAVENOUS
Status: DISCONTINUED | OUTPATIENT
Start: 2025-03-12 | End: 2025-03-13 | Stop reason: HOSPADM

## 2025-03-12 RX ORDER — ACETAMINOPHEN 325 MG/1
650 TABLET ORAL EVERY 4 HOURS PRN
Status: DISCONTINUED | OUTPATIENT
Start: 2025-03-12 | End: 2025-03-13 | Stop reason: HOSPADM

## 2025-03-12 RX ORDER — OXYTOCIN/0.9 % SODIUM CHLORIDE 30/500 ML
100-340 PLASTIC BAG, INJECTION (ML) INTRAVENOUS CONTINUOUS PRN
Status: DISCONTINUED | OUTPATIENT
Start: 2025-03-12 | End: 2025-03-13

## 2025-03-12 RX ORDER — MISOPROSTOL 100 UG/1
25 TABLET ORAL
Status: DISCONTINUED | OUTPATIENT
Start: 2025-03-12 | End: 2025-03-13 | Stop reason: HOSPADM

## 2025-03-12 RX ORDER — LOPERAMIDE HYDROCHLORIDE 2 MG/1
4 CAPSULE ORAL
Status: DISCONTINUED | OUTPATIENT
Start: 2025-03-12 | End: 2025-03-13 | Stop reason: HOSPADM

## 2025-03-12 RX ORDER — METOCLOPRAMIDE 10 MG/1
10 TABLET ORAL EVERY 6 HOURS PRN
Status: DISCONTINUED | OUTPATIENT
Start: 2025-03-12 | End: 2025-03-13 | Stop reason: HOSPADM

## 2025-03-12 RX ORDER — CITRIC ACID/SODIUM CITRATE 334-500MG
30 SOLUTION, ORAL ORAL
Status: DISCONTINUED | OUTPATIENT
Start: 2025-03-12 | End: 2025-03-13 | Stop reason: HOSPADM

## 2025-03-12 RX ORDER — LIDOCAINE HYDROCHLORIDE AND EPINEPHRINE 15; 5 MG/ML; UG/ML
3 INJECTION, SOLUTION EPIDURAL
Status: COMPLETED | OUTPATIENT
Start: 2025-03-12 | End: 2025-03-12

## 2025-03-12 RX ORDER — ONDANSETRON 4 MG/1
4 TABLET, ORALLY DISINTEGRATING ORAL EVERY 6 HOURS PRN
Status: DISCONTINUED | OUTPATIENT
Start: 2025-03-12 | End: 2025-03-13 | Stop reason: HOSPADM

## 2025-03-12 RX ORDER — MISOPROSTOL 100 UG/1
400 TABLET ORAL
Status: DISCONTINUED | OUTPATIENT
Start: 2025-03-12 | End: 2025-03-13 | Stop reason: HOSPADM

## 2025-03-12 RX ORDER — NALOXONE HYDROCHLORIDE 0.4 MG/ML
0.4 INJECTION, SOLUTION INTRAMUSCULAR; INTRAVENOUS; SUBCUTANEOUS
Status: DISCONTINUED | OUTPATIENT
Start: 2025-03-12 | End: 2025-03-13

## 2025-03-12 RX ORDER — OXYTOCIN 10 [USP'U]/ML
10 INJECTION, SOLUTION INTRAMUSCULAR; INTRAVENOUS
Status: DISCONTINUED | OUTPATIENT
Start: 2025-03-12 | End: 2025-03-13

## 2025-03-12 RX ORDER — OXYTOCIN/0.9 % SODIUM CHLORIDE 30/500 ML
340 PLASTIC BAG, INJECTION (ML) INTRAVENOUS CONTINUOUS PRN
Status: DISCONTINUED | OUTPATIENT
Start: 2025-03-12 | End: 2025-03-13 | Stop reason: HOSPADM

## 2025-03-12 RX ORDER — ONDANSETRON 2 MG/ML
4 INJECTION INTRAMUSCULAR; INTRAVENOUS EVERY 6 HOURS PRN
Status: DISCONTINUED | OUTPATIENT
Start: 2025-03-12 | End: 2025-03-13 | Stop reason: HOSPADM

## 2025-03-12 RX ORDER — IBUPROFEN 400 MG/1
800 TABLET, FILM COATED ORAL
Status: DISCONTINUED | OUTPATIENT
Start: 2025-03-12 | End: 2025-03-13 | Stop reason: HOSPADM

## 2025-03-12 RX ORDER — HYDROXYZINE HYDROCHLORIDE 25 MG/1
100 TABLET, FILM COATED ORAL
Status: DISCONTINUED | OUTPATIENT
Start: 2025-03-12 | End: 2025-03-13 | Stop reason: HOSPADM

## 2025-03-12 RX ORDER — HYDROXYZINE HYDROCHLORIDE 25 MG/1
25 TABLET, FILM COATED ORAL EVERY 6 HOURS PRN
Status: DISCONTINUED | OUTPATIENT
Start: 2025-03-12 | End: 2025-03-13

## 2025-03-12 RX ORDER — LIDOCAINE 40 MG/G
CREAM TOPICAL
Status: DISCONTINUED | OUTPATIENT
Start: 2025-03-12 | End: 2025-03-13

## 2025-03-12 RX ORDER — CITRIC ACID/SODIUM CITRATE 334-500MG
30 SOLUTION, ORAL ORAL ONCE
Status: COMPLETED | OUTPATIENT
Start: 2025-03-12 | End: 2025-03-12

## 2025-03-12 RX ORDER — NALBUPHINE HYDROCHLORIDE 10 MG/ML
2.5-5 INJECTION INTRAMUSCULAR; INTRAVENOUS; SUBCUTANEOUS EVERY 6 HOURS PRN
Status: DISCONTINUED | OUTPATIENT
Start: 2025-03-12 | End: 2025-03-13

## 2025-03-12 RX ADMIN — PENICILLIN G POTASSIUM 5 MILLION UNITS: 5000000 POWDER, FOR SOLUTION INTRAMUSCULAR; INTRAPLEURAL; INTRATHECAL; INTRAVENOUS at 22:21

## 2025-03-12 RX ADMIN — SODIUM CITRATE AND CITRIC ACID MONOHYDRATE 30 ML: 334; 500 SOLUTION ORAL at 21:42

## 2025-03-12 RX ADMIN — LIDOCAINE HYDROCHLORIDE: 20 JELLY TOPICAL at 23:46

## 2025-03-12 RX ADMIN — SODIUM CHLORIDE, POTASSIUM CHLORIDE, SODIUM LACTATE AND CALCIUM CHLORIDE 1000 ML: 600; 310; 30; 20 INJECTION, SOLUTION INTRAVENOUS at 18:23

## 2025-03-12 RX ADMIN — LIDOCAINE HYDROCHLORIDE AND EPINEPHRINE 2 ML: 15; 5 INJECTION, SOLUTION EPIDURAL at 22:15

## 2025-03-12 RX ADMIN — LIDOCAINE HYDROCHLORIDE 3 ML: 10 INJECTION, SOLUTION INFILTRATION; PERINEURAL at 22:06

## 2025-03-12 RX ADMIN — LIDOCAINE HYDROCHLORIDE AND EPINEPHRINE 3 ML: 15; 5 INJECTION, SOLUTION EPIDURAL at 22:13

## 2025-03-12 RX ADMIN — SODIUM CHLORIDE, POTASSIUM CHLORIDE, SODIUM LACTATE AND CALCIUM CHLORIDE: 600; 310; 30; 20 INJECTION, SOLUTION INTRAVENOUS at 21:13

## 2025-03-12 RX ADMIN — Medication 10 ML/HR: at 22:15

## 2025-03-12 ASSESSMENT — ACTIVITIES OF DAILY LIVING (ADL)
ADLS_ACUITY_SCORE: 15

## 2025-03-12 NOTE — PROGRESS NOTES
"Admission Note    Data:  Carol Patterson admitted to 401 from home at 1708.      Action:  Dr. Velasquez has been notified of admission. Pt oriented to unit, call light in reach. Dr. Velasquez to the unit to discuss plan of care.     Response:  Patient is here for her postdates induction of labor. Plan for Cytotec 25 mcg oral overnight every 2 hours as tolerated. Patient presents to the flood and expresses great anxiety for her IOL. Reports she had a rough delivery last time and is \"scared\".     MD at bedside and very reassuring to patient. Okay for epidural anytime. Medications of anxiety and pain ordered.     Baseline on admit 175 with marked variability. Livingston Wheeler quiet. MD reviewed strip and requesting 1 L IV bolus now. Requesting 2 IV's due to return of hemoglobin at 8.4. 2 units of blood on hold in blood bank, as well.     Abebe Ramirez RN 03/12/25 7:01 PM        "
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
## 2025-03-13 LAB
ANION GAP SERPL CALCULATED.3IONS-SCNC: 12 MMOL/L (ref 7–15)
BUN SERPL-MCNC: 10.4 MG/DL (ref 6–20)
CALCIUM SERPL-MCNC: 8.5 MG/DL (ref 8.8–10.4)
CHLORIDE SERPL-SCNC: 104 MMOL/L (ref 98–107)
CREAT SERPL-MCNC: 0.76 MG/DL (ref 0.51–0.95)
EGFRCR SERPLBLD CKD-EPI 2021: >90 ML/MIN/1.73M2
GLUCOSE SERPL-MCNC: 107 MG/DL (ref 70–99)
HCO3 SERPL-SCNC: 20 MMOL/L (ref 22–29)
MAGNESIUM SERPL-MCNC: 1.8 MG/DL (ref 1.7–2.3)
POTASSIUM SERPL-SCNC: 3.8 MMOL/L (ref 3.4–5.3)
SODIUM SERPL-SCNC: 136 MMOL/L (ref 135–145)
TSH SERPL DL<=0.005 MIU/L-ACNC: 1.61 UIU/ML (ref 0.3–4.2)

## 2025-03-13 PROCEDURE — 0KQM0ZZ REPAIR PERINEUM MUSCLE, OPEN APPROACH: ICD-10-PCS | Performed by: STUDENT IN AN ORGANIZED HEALTH CARE EDUCATION/TRAINING PROGRAM

## 2025-03-13 PROCEDURE — 80048 BASIC METABOLIC PNL TOTAL CA: CPT | Performed by: STUDENT IN AN ORGANIZED HEALTH CARE EDUCATION/TRAINING PROGRAM

## 2025-03-13 PROCEDURE — 250N000011 HC RX IP 250 OP 636

## 2025-03-13 PROCEDURE — 36415 COLL VENOUS BLD VENIPUNCTURE: CPT | Performed by: STUDENT IN AN ORGANIZED HEALTH CARE EDUCATION/TRAINING PROGRAM

## 2025-03-13 PROCEDURE — 83735 ASSAY OF MAGNESIUM: CPT | Performed by: STUDENT IN AN ORGANIZED HEALTH CARE EDUCATION/TRAINING PROGRAM

## 2025-03-13 PROCEDURE — 84460 ALANINE AMINO (ALT) (SGPT): CPT | Performed by: STUDENT IN AN ORGANIZED HEALTH CARE EDUCATION/TRAINING PROGRAM

## 2025-03-13 PROCEDURE — 84443 ASSAY THYROID STIM HORMONE: CPT | Performed by: STUDENT IN AN ORGANIZED HEALTH CARE EDUCATION/TRAINING PROGRAM

## 2025-03-13 PROCEDURE — 93010 ELECTROCARDIOGRAM REPORT: CPT | Performed by: INTERNAL MEDICINE

## 2025-03-13 PROCEDURE — 120N000001 HC R&B MED SURG/OB

## 2025-03-13 PROCEDURE — 250N000011 HC RX IP 250 OP 636: Performed by: STUDENT IN AN ORGANIZED HEALTH CARE EDUCATION/TRAINING PROGRAM

## 2025-03-13 PROCEDURE — 99253 IP/OBS CNSLTJ NEW/EST LOW 45: CPT | Performed by: STUDENT IN AN ORGANIZED HEALTH CARE EDUCATION/TRAINING PROGRAM

## 2025-03-13 PROCEDURE — 250N000009 HC RX 250: Performed by: OBSTETRICS & GYNECOLOGY

## 2025-03-13 PROCEDURE — 722N000001 HC LABOR CARE VAGINAL DELIVERY SINGLE

## 2025-03-13 PROCEDURE — 88307 TISSUE EXAM BY PATHOLOGIST: CPT

## 2025-03-13 PROCEDURE — 84450 TRANSFERASE (AST) (SGOT): CPT | Performed by: STUDENT IN AN ORGANIZED HEALTH CARE EDUCATION/TRAINING PROGRAM

## 2025-03-13 PROCEDURE — 59400 OBSTETRICAL CARE: CPT | Performed by: STUDENT IN AN ORGANIZED HEALTH CARE EDUCATION/TRAINING PROGRAM

## 2025-03-13 PROCEDURE — 250N000013 HC RX MED GY IP 250 OP 250 PS 637: Performed by: STUDENT IN AN ORGANIZED HEALTH CARE EDUCATION/TRAINING PROGRAM

## 2025-03-13 RX ORDER — OXYTOCIN 10 [USP'U]/ML
10 INJECTION, SOLUTION INTRAMUSCULAR; INTRAVENOUS
Status: DISCONTINUED | OUTPATIENT
Start: 2025-03-13 | End: 2025-03-14 | Stop reason: HOSPADM

## 2025-03-13 RX ORDER — TRANEXAMIC ACID 10 MG/ML
1 INJECTION, SOLUTION INTRAVENOUS EVERY 30 MIN PRN
Status: DISCONTINUED | OUTPATIENT
Start: 2025-03-13 | End: 2025-03-14 | Stop reason: HOSPADM

## 2025-03-13 RX ORDER — MISOPROSTOL 100 UG/1
400 TABLET ORAL
Status: DISCONTINUED | OUTPATIENT
Start: 2025-03-13 | End: 2025-03-14 | Stop reason: HOSPADM

## 2025-03-13 RX ORDER — HYDROCORTISONE 25 MG/G
CREAM TOPICAL 3 TIMES DAILY PRN
Status: DISCONTINUED | OUTPATIENT
Start: 2025-03-13 | End: 2025-03-14 | Stop reason: HOSPADM

## 2025-03-13 RX ORDER — IBUPROFEN 400 MG/1
800 TABLET, FILM COATED ORAL EVERY 6 HOURS PRN
Status: DISCONTINUED | OUTPATIENT
Start: 2025-03-13 | End: 2025-03-14 | Stop reason: HOSPADM

## 2025-03-13 RX ORDER — LOPERAMIDE HYDROCHLORIDE 2 MG/1
2 CAPSULE ORAL
Status: DISCONTINUED | OUTPATIENT
Start: 2025-03-13 | End: 2025-03-14 | Stop reason: HOSPADM

## 2025-03-13 RX ORDER — POLYETHYLENE GLYCOL 3350 17 G/17G
17 POWDER, FOR SOLUTION ORAL DAILY PRN
Status: DISCONTINUED | OUTPATIENT
Start: 2025-03-13 | End: 2025-03-14 | Stop reason: HOSPADM

## 2025-03-13 RX ORDER — AMOXICILLIN 250 MG
2 CAPSULE ORAL
Status: DISCONTINUED | OUTPATIENT
Start: 2025-03-13 | End: 2025-03-14 | Stop reason: HOSPADM

## 2025-03-13 RX ORDER — BISACODYL 10 MG
10 SUPPOSITORY, RECTAL RECTAL DAILY PRN
Status: DISCONTINUED | OUTPATIENT
Start: 2025-03-13 | End: 2025-03-14 | Stop reason: HOSPADM

## 2025-03-13 RX ORDER — CARBOPROST TROMETHAMINE 250 UG/ML
250 INJECTION, SOLUTION INTRAMUSCULAR
Status: DISCONTINUED | OUTPATIENT
Start: 2025-03-13 | End: 2025-03-14 | Stop reason: HOSPADM

## 2025-03-13 RX ORDER — ACETAMINOPHEN 325 MG/1
650 TABLET ORAL EVERY 4 HOURS PRN
Status: DISCONTINUED | OUTPATIENT
Start: 2025-03-13 | End: 2025-03-14 | Stop reason: HOSPADM

## 2025-03-13 RX ORDER — LOPERAMIDE HYDROCHLORIDE 2 MG/1
4 CAPSULE ORAL
Status: DISCONTINUED | OUTPATIENT
Start: 2025-03-13 | End: 2025-03-14 | Stop reason: HOSPADM

## 2025-03-13 RX ORDER — METHYLERGONOVINE MALEATE 0.2 MG/ML
200 INJECTION INTRAVENOUS
Status: DISCONTINUED | OUTPATIENT
Start: 2025-03-13 | End: 2025-03-14 | Stop reason: HOSPADM

## 2025-03-13 RX ORDER — METOCLOPRAMIDE HYDROCHLORIDE 5 MG/ML
10 INJECTION INTRAMUSCULAR; INTRAVENOUS EVERY 6 HOURS
Status: DISCONTINUED | OUTPATIENT
Start: 2025-03-13 | End: 2025-03-13

## 2025-03-13 RX ORDER — OXYTOCIN/0.9 % SODIUM CHLORIDE 30/500 ML
340 PLASTIC BAG, INJECTION (ML) INTRAVENOUS CONTINUOUS PRN
Status: DISCONTINUED | OUTPATIENT
Start: 2025-03-13 | End: 2025-03-14 | Stop reason: HOSPADM

## 2025-03-13 RX ADMIN — METOCLOPRAMIDE 10 MG: 5 INJECTION, SOLUTION INTRAMUSCULAR; INTRAVENOUS at 07:25

## 2025-03-13 RX ADMIN — IBUPROFEN 800 MG: 400 TABLET, FILM COATED ORAL at 20:43

## 2025-03-13 RX ADMIN — IBUPROFEN 800 MG: 400 TABLET, FILM COATED ORAL at 08:01

## 2025-03-13 RX ADMIN — PSYLLIUM HUSK 1 PACKET: 3.4 POWDER ORAL at 09:38

## 2025-03-13 RX ADMIN — NALBUPHINE HYDROCHLORIDE 2.5 MG: 10 INJECTION, SOLUTION INTRAMUSCULAR; INTRAVENOUS; SUBCUTANEOUS at 04:35

## 2025-03-13 RX ADMIN — MISOPROSTOL 800 MCG: 200 TABLET ORAL at 06:58

## 2025-03-13 RX ADMIN — METHYLERGONOVINE MALEATE 200 MCG: 0.2 INJECTION INTRAVENOUS at 06:58

## 2025-03-13 RX ADMIN — Medication: at 05:02

## 2025-03-13 RX ADMIN — PENICILLIN G 3 MILLION UNITS: 3000000 INJECTION, SOLUTION INTRAVENOUS at 02:16

## 2025-03-13 RX ADMIN — ACETAMINOPHEN 650 MG: 325 TABLET, FILM COATED ORAL at 11:14

## 2025-03-13 RX ADMIN — Medication 340 ML/HR: at 06:23

## 2025-03-13 RX ADMIN — BENZOCAINE AND LEVOMENTHOL: 200; 5 SPRAY TOPICAL at 09:38

## 2025-03-13 ASSESSMENT — ACTIVITIES OF DAILY LIVING (ADL)
ADLS_ACUITY_SCORE: 15

## 2025-03-13 NOTE — ANESTHESIA PROCEDURE NOTES
"Epidural catheter Procedure Note    Pre-Procedure   Staff -        CRNA: Wilfred Garrett APRN CRNA       Performed By: CRNA       Location: OB       Procedure Start/Stop Times: 3/12/2025 9:49 AM and 3/12/2025 10:15 AM       Pre-Anesthestic Checklist: patient identified, IV checked, risks and benefits discussed, informed consent, monitors and equipment checked, pre-op evaluation, at physician/surgeon's request and post-op pain management  Timeout:       Correct Patient: Yes        Correct Procedure: Yes        Correct Site: Yes        Correct Position: Yes   Procedure Documentation  Procedure: epidural catheter         Diagnosis: Labor analgesia       Patient Position: sitting       Patient Prep/Sterile Barriers: sterile gloves, mask, patient draped       Skin prep: Chloraprep       Local skin infiltrated with 3 mL of 1% lidocaine.        Insertion Site: L3-4. (midline approach).       Technique: LORT air        RAMON at 5 cm.       Needle Type: Touhy needle       Needle Gauge: 17.        Needle Length (Inches): 3.5        Catheter: 19 G.          Catheter threaded easily.         7 cm epidural space.         Threaded 12 cm at skin.         # of attempts:  # of redirects:  0    Assessment/Narrative         Paresthesias: No.       Test dose of 5 mL lidocaine 1.5% w/ 1:200,000 epinephrine at 22:13 CDT.         Test dose negative, 3 minutes after injection, for signs of intravascular, subdural, or intrathecal injection.       Insertion/Infusion Method: LORT air       Aspiration negative for Heme or CSF via Epidural Catheter.    Medication(s) Administered   Medication Administration Time: 3/12/2025 9:49 AM      FOR [facility] ([facility]) ONLY:   Pain Team Contact information: please page the Pain Team Via [employer]. Search \"Pain\". During daytime hours, please page the attending first. At night please page the resident first.      "

## 2025-03-13 NOTE — PLAN OF CARE
Goal Outcome Evaluation:             Patient ambulated to bathroom with standby assist. Voided 700mL.  Complains of uterine cramping and perineum pain.  Ice and tucks to perineum.  Hot pack to abdomen.  Patient declines tylenol at this time.  Resting in bed.

## 2025-03-13 NOTE — PROGRESS NOTES
"Spoke with pt at 2020 to discuss cervical ripening and plan for the evening. Discussed pain management including epidural.   Educated the pt that with getting an epidural we need to have a bladder management plan. Pt states \"I am afraid of having a catheter and would not like one.\"    Continued discussing pain management options and pt would like to talk with .      notified.        "

## 2025-03-13 NOTE — ANESTHESIA POSTPROCEDURE EVALUATION
Patient: Carol CULLEN Ponthieux    Procedure: * No procedures listed *       Anesthesia Type:  Epidural    Note:  Disposition: Outpatient   Postop Pain Control: Uneventful            Sign Out: Well controlled pain   PONV: No   Neuro/Psych: Uneventful            Sign Out: Acceptable/Baseline neuro status   Airway/Respiratory: Uneventful            Sign Out: Acceptable/Baseline resp. status   CV/Hemodynamics: Uneventful            Sign Out: Acceptable CV status; No obvious hypovolemia; No obvious fluid overload   Other NRE: NONE   DID A NON-ROUTINE EVENT OCCUR? No    Event details/Postop Comments:  Patient satisfied with labor epidural. Patient has been ambulating without difficulty. Denies headache, denies numbness/tingling in lower extremities, is voiding without difficulty.   MIGDALIA Costello CRNA on 3/13/2025 at 5:10 PM             Last vitals:  Vitals:    03/13/25 0911 03/13/25 0943 03/13/25 0944   BP:   122/66   Resp:      Temp:  98.8  F (37.1  C)    SpO2: 99%         Electronically Signed By: MIGDALIA Costello CRNA  March 13, 2025  5:07 PM

## 2025-03-13 NOTE — PROGRESS NOTES
Carol Patterson  MRN: [id number]  Gestational Age: 40w6d      Single viable baby girl born via spontaneous vaginal delivery. Born on 3/13/25 @ 0540 delivered by Dr. Velasquez.  Placenta delivered and Pitocin administered per order. 2nd degree perineal tear with repair.  Initial post-partum vitals stable.

## 2025-03-13 NOTE — ANESTHESIA PREPROCEDURE EVALUATION
Anesthesia Pre-Procedure Evaluation    Patient: Carol Patterson   MRN: [id number] : 2000        Procedure :           Past Medical History:   Diagnosis Date    Anemia     Encounter for triage in pregnant patient 2024    Marginal insertion of umbilical cord affecting management of mother 2020    Formatting of this note might be different from the original.   Diagnosed on 20 week fetal survey. Will need serial growth ultrasounds and consider  testing starting at 32 weeks gestational age      Nexplanon in place 2021    Formatting of this note might be different from the original.   Insert date: 3/11/2021.   Removal due: 3/11/2024.      Poor fetal growth affecting management of mother in third trimester 2021    Formatting of this note might be different from the original.   Already getting twice a week  testing with biophysical profile and NST.  Will start twice a week umbilical cord doppler studies as well.  If these remain normal, plan to induce at 39 weeks gestational age.  If umbilical cord doppler study abnormal plan to induce before then.      Uncomplicated asthma     Urinary tract infection       History reviewed. No pertinent surgical history.   Allergies   Allergen Reactions    Cats Shortness Of Breath    Dust Mite Extract Shortness Of Breath      Social History     Tobacco Use    Smoking status: Never     Passive exposure: Never    Smokeless tobacco: Never   Substance Use Topics    Alcohol use: Not Currently     Comment: 1 beer a night      Wt Readings from Last 1 Encounters:   25 62.7 kg (138 lb 3.2 oz)        Anesthesia Evaluation   Pt has had prior anesthetic. Type: OB Labor Epidural.    No history of anesthetic complications       ROS/MED HX  ENT/Pulmonary: Comment: Hx of exercise-induced asthma that has resolved per patient, no albuterol use in past years.     (+)                      asthma                  Neurologic:       Cardiovascular:  -  "neg cardiovascular ROS     METS/Exercise Tolerance: >4 METS    Hematologic:  - neg hematologic  ROS     Musculoskeletal:  - neg musculoskeletal ROS (+)       scoliosis,        GI/Hepatic:     (+) GERD, Asymptomatic on medication,                  Renal/Genitourinary:  - neg Renal ROS     Endo:  - neg endo ROS     Psychiatric/Substance Use:     (+) psychiatric history anxiety       Infectious Disease:  - neg infectious disease ROS     Malignancy:  - neg malignancy ROS     Other:   IUGR   (+) Possibly pregnant, , ,         Physical Exam    Airway        Mallampati: III   TM distance: > 3 FB   Neck ROM: full   Mouth opening: > 3 cm    Respiratory Devices and Support         Dental       (+) Completely normal teeth      Cardiovascular   cardiovascular exam normal       Rhythm and rate: regular and normal     Pulmonary   pulmonary exam normal        breath sounds clear to auscultation           OUTSIDE LABS:  CBC:   Lab Results   Component Value Date    WBC 11.2 (H) 03/12/2025    WBC 8.9 12/17/2024    HGB 8.4 (L) 03/12/2025    HGB 9.9 (L) 12/17/2024    HCT 27.0 (L) 03/12/2025    HCT 30.9 (L) 12/17/2024     03/12/2025     12/17/2024     BMP:   Lab Results   Component Value Date     07/22/2024    POTASSIUM 4.2 07/22/2024    CHLORIDE 102 07/22/2024    CO2 23 07/22/2024    BUN 11.5 07/22/2024    CR 0.63 07/22/2024    GLC 87 07/22/2024     COAGS: No results found for: \"PTT\", \"INR\", \"FIBR\"  POC: No results found for: \"BGM\", \"HCG\", \"HCGS\"  HEPATIC: No results found for: \"ALBUMIN\", \"PROTTOTAL\", \"ALT\", \"AST\", \"GGT\", \"ALKPHOS\", \"BILITOTAL\", \"BILIDIRECT\", \"TADEO\"  OTHER:   Lab Results   Component Value Date    JAKY 9.3 07/22/2024    TSH 1.81 09/19/2024       Anesthesia Plan    ASA Status:  2       Anesthesia Type: Epidural.              Consents          - Specific Concerns: PDPH, infection, nerve injury/damage, and bleeding..     - Extended Intubation/Ventilatory Support Discussed: No.      - Patient is DNR/DNI " Status: No     Use of blood products discussed: No .     Postoperative Care            Comments:           neg OB ROS.      MIGDALIA Domínguez CRNA    I have reviewed the pertinent notes and labs in the chart from the past 30 days and (re)examined the patient.  Any updates or changes from those notes are reflected in this note.    Clinically Significant Risk Factors Present on Admission                 # Drug Induced Platelet Defect: home medication list includes an antiplatelet medication        # Anemia: based on hgb <11           # Asthma: noted on problem list

## 2025-03-13 NOTE — PROGRESS NOTES
Up to the bathroom. Encouraged independence. Patient is stable on feet and can be independent as tolerated.    Abebe Ramirez RN 03/13/25 6:35 PM

## 2025-03-13 NOTE — PLAN OF CARE
Pt is doing well. She has been up in chair much of the day, Responding to infants needs. Lots of reassurance provided. Fundus is firm, 2 below umbilicus and midline. Lochia is light. Voiding spontaneous without issue. Vitals are stable. Complains of some cramping but declines medication intervention at this time. Expressed desire to breastfeed, breastfeed 2 sessions with assistance then decided to supplement with formula as nipples are sore. Offered lanolin cream and hydrogel patches but patient declines. Bonding well with , holding baby often.     /57   Temp 97.7  F (36.5  C)   Resp 16   LMP 2024 (Approximate)   SpO2 99%   Breastfeeding Unknown

## 2025-03-13 NOTE — CONSULTS
[facility]  Consult Note - Hospitalist Service  Date of Admission:  3/12/2025  Consult Requested by: Dr. Perry  Reason for Consult: Sinus bradycardia    Assessment & Plan   Carol Patterson is a 24 year old woman who internal medicine was consulted for postpartum bradycardia      Sinus bradycardia with sinus arrhythmia   Assessment: Bradycardia appears to be while sleeping with rates to the 30s.  EKG demonstrates sinus rhythm with sinus arrhythmia.  No first degree relatives with cardiac disease to her knowledge.  Asymptomatic with episodes.  No syncope history of prior to pregnancy.  Per up-to-date mild sinus bradycardia may occur transiently after normal delivery and may persist for a few days in the postpartum period.  Patient has no known structural heart disease and is seldom associated with symptoms and requires no intervention.  Will place on cardiac monitor and if she becomes symptomatic or bradycardia persists to the awake.  Will obtain an echocardiogram.  Will also obtain basic labs including electrolytes and thyroid.  If significant abnormality will treat but otherwise just monitor for now.  Plan:   -BMP, TSH, magnesium  -Cardiac monitor  -EKG if persistently bradycardic  -Echo if symptomatic or persistently bradycardic         Clinically Significant Risk Factors Present on Admission                 # Drug Induced Platelet Defect: home medication list includes an antiplatelet medication        # Anemia: based on hgb <11           # Asthma: noted on problem list        John Torres MD  Hospitalist Service  Securely message with [employer] (more info)  Text page via [employer] Paging/Directory   ______________________________________________________________________    Chief Complaint   Post partum    History is obtained from the patient    History of Present Illness   Carol Patterson is a 24 year old -0-0-2 who medicine was consulted for postpartum bradycardia.    The patient had  presented for IOL but never needed cervical ripening and went into labor on her own.  Delivery.  Was unremarkable other than a second-degree perineal laceration.  Patient was given Pitocin without any significant postpartum bleeding.  Epidural catheter was placed by anesthesia without event.  The patient was noted to have intermittent bradycardia particularly when sleeping.  EKG was performed while the patient was awake which demonstrated sinus bradycardia with sinus arrhythmia.    On my interview the patient she endorses no chest pain no shortness of breath.  She has a history of exercise-induced asthma but think she has grown out of this.  She states she has a remote uncle who she does not know who had heart attacks in his 20s to 30s.  No other cardiac history to her knowledge.  No family history of coronary artery disease, pacemakers, syncope or stroke.  No atrial fibrillation in her family.    The patient has been asymptomatic during these medications.  She states that she has had no alcohol drink and does not use any substances.        Past Medical History    Past Medical History:   Diagnosis Date    Anemia     Encounter for triage in pregnant patient 2024    Marginal insertion of umbilical cord affecting management of mother 2020    Formatting of this note might be different from the original.   Diagnosed on 20 week fetal survey. Will need serial growth ultrasounds and consider  testing starting at 32 weeks gestational age      Nexplanon in place 2021    Formatting of this note might be different from the original.   Insert date: 3/11/2021.   Removal due: 3/11/2024.      Poor fetal growth affecting management of mother in third trimester 2021    Formatting of this note might be different from the original.   Already getting twice a week  testing with biophysical profile and NST.  Will start twice a week umbilical cord doppler studies as well.  If these remain normal,  plan to induce at 39 weeks gestational age.  If umbilical cord doppler study abnormal plan to induce before then.      Uncomplicated asthma     Urinary tract infection        Past Surgical History   History reviewed. No pertinent surgical history.    Medications   I have reviewed this patient's current medications          Physical Exam   Vital Signs: Temp: 98.8  F (37.1  C) Temp src: Temporal BP: 122/66     Resp: 16 SpO2: 99 % O2 Device: None (Room air)    Weight: 0 lbs 0 oz  General: Pleasant 24-year-old woman sitting in chair holding her baby no acute distress  HEENT: Within normal limits  CV: Regular rate and rhythm no murmur appreciated  Pulmonary: Clear to auscultation no crackles rales wheezes.    Medical Decision Making           Data     I have personally reviewed the following data over the past 24 hrs:    11.2 (H)  \   8.4 (L)   / 388     N/A N/A N/A /  N/A   N/A N/A N/A \       Imaging results reviewed over the past 24 hrs:   No results found for this or any previous visit (from the past 24 hours).

## 2025-03-13 NOTE — PLAN OF CARE
Goal Outcome Evaluation:             Fundus is firm without massage. No further clots.  Patient has rigors, nausea and uterine cramping.  Gave ibuprofen and hot pack and warm blanket. Spouse is at the bedside.  Bradycardia.  EKG competed. Will start telemetry.

## 2025-03-13 NOTE — PROGRESS NOTES
Called to bedside for continued bleeding, she has received cytotec, and methergine. Repeat fundal check shows no further bleeding. Firm fundus at 2 below the umbilicus.    Her heart rate was noted to be in the 40s, she has stable BP and is asymptomatic. EKG ordered for evaluation of bradycardia. When she was more awake, her HR jumped up to the 60-70s. She denies any symptoms.    Aurora Velasquez MD on 3/13/2025 at 7:51 AM

## 2025-03-13 NOTE — PROGRESS NOTES
Labor Progress Note    Ms. Patterson is feeling much more of her contractions. They are very strong. She denies any leaking of fluid or vaginal bleeding.     Exam:  /71 (BP Location: Right arm, Patient Position: Sitting, Cuff Size: Adult Regular)   Temp 98.8  F (37.1  C) (Temporal)   Resp 16   LMP 2024 (Approximate)    Gen: getting more uncomfortable    FHT: 140 bpm, moderate variability, +accels, no decels (Cat 1)    Neeses: 4-5 contractions in 10 min, spontaneous    Membranes: intact    SVE: 60/-2      Assessment & Plan:   is a 24 year old  at 40w5d by 7 week US admitted for eIOL, she has gone into labor spontaneously    FWB: Cat 1  Labor: spontaneous  Pain: epidural planned  GBS: positive, PCN started  Rh: positive  Rubella: immune  Continue routine labor management.   Anticipate     Aurora Velasquez MD 9:18 PM

## 2025-03-13 NOTE — PROGRESS NOTES
called at 0508 to inform her that pt SROM at 0459 and is dilated to 7 cm.     She will be making her way in.

## 2025-03-14 VITALS
SYSTOLIC BLOOD PRESSURE: 118 MMHG | WEIGHT: 133.2 LBS | BODY MASS INDEX: 23.6 KG/M2 | OXYGEN SATURATION: 99 % | RESPIRATION RATE: 16 BRPM | DIASTOLIC BLOOD PRESSURE: 63 MMHG | TEMPERATURE: 96.3 F

## 2025-03-14 PROBLEM — Z36.89 ENCOUNTER FOR TRIAGE IN PREGNANT PATIENT: Status: RESOLVED | Noted: 2024-12-08 | Resolved: 2025-03-14

## 2025-03-14 PROBLEM — Z34.90 ENCOUNTER FOR PLANNED INDUCTION OF LABOR: Status: ACTIVE | Noted: 2025-03-14

## 2025-03-14 PROBLEM — Z97.5 NEXPLANON IN PLACE: Status: ACTIVE | Noted: 2021-03-11

## 2025-03-14 PROBLEM — Z34.93 THIRD TRIMESTER PREGNANCY: Status: ACTIVE | Noted: 2025-03-14

## 2025-03-14 PROBLEM — Z34.90 ENCOUNTER FOR PLANNED INDUCTION OF LABOR: Status: RESOLVED | Noted: 2025-03-14 | Resolved: 2025-03-14

## 2025-03-14 PROBLEM — Z34.93 THIRD TRIMESTER PREGNANCY: Status: RESOLVED | Noted: 2025-03-14 | Resolved: 2025-03-14

## 2025-03-14 PROBLEM — Z34.90 ENCOUNTER FOR INDUCTION OF LABOR: Status: RESOLVED | Noted: 2025-03-12 | Resolved: 2025-03-14

## 2025-03-14 LAB
ALT SERPL W P-5'-P-CCNC: 8 U/L (ref 0–50)
AST SERPL W P-5'-P-CCNC: 25 U/L (ref 0–45)
ATRIAL RATE - MUSE: 53 BPM
DIASTOLIC BLOOD PRESSURE - MUSE: NORMAL MMHG
ERYTHROCYTE [DISTWIDTH] IN BLOOD BY AUTOMATED COUNT: 16.9 % (ref 10–15)
HCT VFR BLD AUTO: 25.2 % (ref 35–47)
HGB BLD-MCNC: 7.8 G/DL (ref 11.7–15.7)
HGB BLD-MCNC: 7.8 G/DL (ref 11.7–15.7)
INTERPRETATION ECG - MUSE: NORMAL
MCH RBC QN AUTO: 25.3 PG (ref 26.5–33)
MCHC RBC AUTO-ENTMCNC: 30.4 G/DL (ref 31.5–36.5)
MCV RBC AUTO: 83 FL (ref 78–100)
P AXIS - MUSE: 58 DEGREES
PLATELET # BLD AUTO: 286 10E3/UL (ref 150–450)
PR INTERVAL - MUSE: 144 MS
QRS DURATION - MUSE: 76 MS
QT - MUSE: 448 MS
QTC - MUSE: 420 MS
R AXIS - MUSE: 15 DEGREES
RBC # BLD AUTO: 3.04 10E6/UL (ref 3.8–5.2)
SYSTOLIC BLOOD PRESSURE - MUSE: NORMAL MMHG
T AXIS - MUSE: 29 DEGREES
T PALLIDUM AB SER QL: NONREACTIVE
VENTRICULAR RATE- MUSE: 53 BPM
WBC # BLD AUTO: 13.7 10E3/UL (ref 4–11)

## 2025-03-14 PROCEDURE — 250N000013 HC RX MED GY IP 250 OP 250 PS 637: Performed by: STUDENT IN AN ORGANIZED HEALTH CARE EDUCATION/TRAINING PROGRAM

## 2025-03-14 PROCEDURE — 250N000011 HC RX IP 250 OP 636: Performed by: STUDENT IN AN ORGANIZED HEALTH CARE EDUCATION/TRAINING PROGRAM

## 2025-03-14 PROCEDURE — 36415 COLL VENOUS BLD VENIPUNCTURE: CPT | Performed by: STUDENT IN AN ORGANIZED HEALTH CARE EDUCATION/TRAINING PROGRAM

## 2025-03-14 PROCEDURE — 85014 HEMATOCRIT: CPT | Performed by: STUDENT IN AN ORGANIZED HEALTH CARE EDUCATION/TRAINING PROGRAM

## 2025-03-14 PROCEDURE — 258N000003 HC RX IP 258 OP 636: Performed by: STUDENT IN AN ORGANIZED HEALTH CARE EDUCATION/TRAINING PROGRAM

## 2025-03-14 PROCEDURE — 0JHF3HZ INSERTION OF CONTRACEPTIVE DEVICE INTO LEFT UPPER ARM SUBCUTANEOUS TISSUE AND FASCIA, PERCUTANEOUS APPROACH: ICD-10-PCS | Performed by: STUDENT IN AN ORGANIZED HEALTH CARE EDUCATION/TRAINING PROGRAM

## 2025-03-14 PROCEDURE — 250N000013 HC RX MED GY IP 250 OP 250 PS 637: Performed by: OBSTETRICS & GYNECOLOGY

## 2025-03-14 PROCEDURE — 250N000009 HC RX 250: Performed by: STUDENT IN AN ORGANIZED HEALTH CARE EDUCATION/TRAINING PROGRAM

## 2025-03-14 PROCEDURE — 85018 HEMOGLOBIN: CPT | Performed by: STUDENT IN AN ORGANIZED HEALTH CARE EDUCATION/TRAINING PROGRAM

## 2025-03-14 RX ORDER — MEPERIDINE HYDROCHLORIDE 25 MG/ML
25 INJECTION INTRAMUSCULAR; INTRAVENOUS; SUBCUTANEOUS
Status: DISCONTINUED | OUTPATIENT
Start: 2025-03-14 | End: 2025-03-14 | Stop reason: HOSPADM

## 2025-03-14 RX ORDER — ACETAMINOPHEN 325 MG/1
650 TABLET ORAL EVERY 6 HOURS PRN
Qty: 100 TABLET | Refills: 0 | Status: SHIPPED | OUTPATIENT
Start: 2025-03-14

## 2025-03-14 RX ORDER — BISMUTH SUBSALICYLATE 262 MG/1
524 TABLET, CHEWABLE ORAL DAILY PRN
Status: DISCONTINUED | OUTPATIENT
Start: 2025-03-14 | End: 2025-03-14 | Stop reason: HOSPADM

## 2025-03-14 RX ORDER — METHYLPREDNISOLONE SODIUM SUCCINATE 40 MG/ML
40 INJECTION INTRAMUSCULAR; INTRAVENOUS
Status: DISCONTINUED | OUTPATIENT
Start: 2025-03-14 | End: 2025-03-14 | Stop reason: HOSPADM

## 2025-03-14 RX ORDER — IBUPROFEN 600 MG/1
600 TABLET, FILM COATED ORAL EVERY 6 HOURS PRN
Qty: 60 TABLET | Refills: 0 | Status: SHIPPED | OUTPATIENT
Start: 2025-03-14

## 2025-03-14 RX ORDER — DIPHENHYDRAMINE HYDROCHLORIDE 50 MG/ML
25 INJECTION, SOLUTION INTRAMUSCULAR; INTRAVENOUS
Status: DISCONTINUED | OUTPATIENT
Start: 2025-03-14 | End: 2025-03-14 | Stop reason: HOSPADM

## 2025-03-14 RX ORDER — POLYETHYLENE GLYCOL 3350 17 G/17G
1 POWDER, FOR SOLUTION ORAL DAILY
Qty: 510 G | Refills: 0 | Status: SHIPPED | OUTPATIENT
Start: 2025-03-14

## 2025-03-14 RX ORDER — ALBUTEROL SULFATE 0.83 MG/ML
2.5 SOLUTION RESPIRATORY (INHALATION)
Status: DISCONTINUED | OUTPATIENT
Start: 2025-03-14 | End: 2025-03-14 | Stop reason: HOSPADM

## 2025-03-14 RX ORDER — LOPERAMIDE HYDROCHLORIDE 2 MG/1
4 CAPSULE ORAL 4 TIMES DAILY PRN
Status: DISCONTINUED | OUTPATIENT
Start: 2025-03-14 | End: 2025-03-14 | Stop reason: HOSPADM

## 2025-03-14 RX ORDER — DIPHENHYDRAMINE HYDROCHLORIDE 50 MG/ML
50 INJECTION, SOLUTION INTRAMUSCULAR; INTRAVENOUS
Status: DISCONTINUED | OUTPATIENT
Start: 2025-03-14 | End: 2025-03-14 | Stop reason: HOSPADM

## 2025-03-14 RX ORDER — ALBUTEROL SULFATE 90 UG/1
1-2 INHALANT RESPIRATORY (INHALATION)
Status: DISCONTINUED | OUTPATIENT
Start: 2025-03-14 | End: 2025-03-14 | Stop reason: HOSPADM

## 2025-03-14 RX ADMIN — LIDOCAINE HYDROCHLORIDE 5 ML: 10 INJECTION, SOLUTION EPIDURAL; INFILTRATION; INTRACAUDAL; PERINEURAL at 13:42

## 2025-03-14 RX ADMIN — LOPERAMIDE HYDROCHLORIDE 4 MG: 2 CAPSULE ORAL at 02:08

## 2025-03-14 RX ADMIN — ETONOGESTREL 68 MG: 68 IMPLANT SUBCUTANEOUS at 13:42

## 2025-03-14 RX ADMIN — PSYLLIUM HUSK 1 PACKET: 3.4 POWDER ORAL at 12:19

## 2025-03-14 RX ADMIN — IRON SUCROSE 300 MG: 20 INJECTION, SOLUTION INTRAVENOUS at 12:20

## 2025-03-14 ASSESSMENT — ACTIVITIES OF DAILY LIVING (ADL)
ADLS_ACUITY_SCORE: 15
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
## 2025-03-14 NOTE — PROGRESS NOTES
Brief progress note    Chart reviewed heart rates in the 50s overnight when sleeping.  Labs are unremarkable as far as electrolytes BMP and thyroid.  Patient has been asymptomatic went to visit patient but she was sleeping on arrival.  No further workup warranted at this time.  Medicine will sign off.  Please contact us if bradycardia worsens.    No charge for this visit    John Torres MD on 3/14/2025 at 9:57 AM

## 2025-03-14 NOTE — PLAN OF CARE
Carol Patterson is doing well after Vaginal birth. Fundus is firm with light bleeding and no clots. Patient is breast feeding and bottle feeding. Patient has minimal amounts of pain that is well managed with ice and prn oral medications. Patient is ambulating independently in room. She is voiding, spontaneously and without difficulty. Bowel sounds are active. atient has verbalized understanding of routine cares and warning signs. Positive bonding behaviors noted. Patient asking questions when needed.
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
## 2025-03-14 NOTE — PROGRESS NOTES
Patient given BP cuff and three blood pressure handouts.    Patient has follow up appointment next week for Blood pressure, and mood check.

## 2025-03-14 NOTE — PLAN OF CARE
Goal Outcome Evaluation:    Patient Discharged to home with her  and baby at 1615.  Fundus is firm. Bleeding is light.  She is independent with her cares.  She voiced understanding of when to be concerned and follow up appointments.

## 2025-03-14 NOTE — PROGRESS NOTES
:     Notified [facility] SW of consult. Removed from MSP list.     HERMILA Peraza on 3/14/2025 at 8:01 AM

## 2025-03-14 NOTE — PROCEDURES
Nexplanon Insertion Note:  Name: Carol Patterson  MRN: [id number]  : 2000   Procedure date: 2025       Consent: Risks, benefits of treatment, and treatment were discussed. Patient's questions were elicited and answered. Written consent signed and scanned into medical record. Patient received and verbalized understanding of discharge instructions. Patient voiced understanding of the side effect of irregular spotting with Nexplanon and she voiced understanding that we won't recommend removal for the 3 months.    Technique: Patient was consented for Nexplanon placement. The patient's LEFT arm was flexed and externally rotated with her wrist parallel to her ear. At 10cm from the medial epicondyle of the humerus and 5cm posterior to the sulcus (groove) between the biceps and triceps muscles, a total of 3mL 1% plain lidocaine was injected after the area was cleansed with alcohol swap. Then, betadine swap x3 was used to clean the area. The tip of the Nexplanon applicator was then inserted at a 20 degree angle into the nick at the insertion karey and the needle was advanced the full length keeping the skin tented during insertion. The applicator seal was broken and the cannula was then retracted. The patient's arm was examined and the Nexplanon phuong was easily palpated. The patient also palpated her arm and was able to appreciate the presence of the phuong. Pressure was held at the insertion akrey. A pressure bandage was then placed using Kerlix gauze. The patient tolerated the procedure very well.     EBL: <5 mL   Complications: None    Nexplanon Lot number: [id number]  Expiration date: 2027  S/N: [id number]  GTIN: [id number]    Bonnie Nicole MD  Obstetrics and Gynecology  2025 1:50 PM

## 2025-03-14 NOTE — DISCHARGE INSTRUCTIONS
Warning Signs after Having a Baby    Keep this paper on your fridge or somewhere else where you can see it.    Call your provider if you have any of these symptoms up to 12 weeks after having your baby.    Thoughts of hurting yourself or your baby  Pain in your chest or trouble breathing  Severe headache not helped by pain medicine  Eyesight concerns (blurry vision, seeing spots or flashes of light, other changes to eyesight)  Fainting, shaking or other signs of a seizure    Call 9-1-1 if you feel that it is an emergency.     The symptoms below can happen to anyone after giving birth. They can be very serious. Call your provider if you have any of these warning signs.    My provider s phone number: _______________________    Losing too much blood (hemorrhage)    Call your provider if you soak through a pad in less than an hour or pass blood clots bigger than a golf ball. These may be signs that you are bleeding too much.    Blood clots in the legs or lungs    After you give birth, your body naturally clots its blood to help prevent blood loss. Sometimes this increased clotting can happen in other areas of the body, like the legs or lungs. This can block your blood flow and be very dangerous.     Call your provider if you:  Have a red, swollen spot on the back of your leg that is warm or painful when you touch it.   Are coughing up blood.     Infection    Call your provider if you have any of these symptoms:  Fever of 100.4 F (38 C) or higher.  Pain or redness around your stitches if you had an incision.   Any yellow, white, or green fluid coming from places where you had stitches or surgery.    Mood Problems (postpartum depression)    Many people feel sad or have mood changes after having a baby. But for some people, these mood swings are worse.     Call your provider right away if you feel so anxious or nervous that you can't care for yourself or your baby.    Preeclampsia (high blood pressure)    Even if you  didn't have high blood pressure when you were pregnant, you are at risk for the high blood pressure disease called preeclampsia. This risk can last up to 12 weeks after giving birth.     Call your provider if you have:   Pain on your right side under your rib cage  Sudden swelling in the hands and face    Remember: You know your body. If something doesn't feel right, get medical help.     For informational purposes only. Not to replace the advice of your health care provider. Copyright 2020 [facility]. All rights reserved. Clinically reviewed by Hailey Cary, RNC-OB, MSN. Tizaro [id number] - Rev 02/23.

## 2025-03-14 NOTE — PLAN OF CARE
Carol Patterson is doing well. Vitals are stable: /64 (BP Location: Right arm, Patient Position: Semi-Loomis's, Cuff Size: Adult Small)   Temp 96.8  F (36  C) (Temporal)   Resp 16   LMP 2024 (Approximate)   SpO2 99%   Breastfeeding Unknown     FF, midline and 2 above. Bleeding is light with no clots noted. Pain has been well managed with oral analgesics. Voiding without difficulty. Patient IS passing gas. Independent in room. Tolerating a Regular diet and oral rehydration. IV is Saline Locked. breastfeeding and supplementing with formula and would benefit from continued education. Bonding well with . Patient has verbalized understanding of routine cares and warning signs.      Problem: Postpartum (Vaginal Delivery)  Goal: Successful Parent Role Transition  Outcome: Progressing  Goal: Hemostasis  Outcome: Progressing  Goal: Absence of Infection Signs and Symptoms  Outcome: Progressing  Goal: Anesthesia/Sedation Recovery  Outcome: Progressing  Goal: Optimal Pain Control and Function  Outcome: Progressing  Goal: Effective Urinary Elimination  Outcome: Progressing     Problem: Breastfeeding  Goal: Effective Breastfeeding  Outcome: Progressing
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
## 2025-03-19 ENCOUNTER — OFFICE VISIT (OUTPATIENT)
Dept: OBGYN | Facility: OTHER | Age: 25
End: 2025-03-19
Attending: NURSE PRACTITIONER
Payer: COMMERCIAL

## 2025-03-19 VITALS — SYSTOLIC BLOOD PRESSURE: 112 MMHG | DIASTOLIC BLOOD PRESSURE: 72 MMHG | HEART RATE: 72 BPM

## 2025-03-19 DIAGNOSIS — F41.9 ANXIETY: ICD-10-CM

## 2025-03-19 DIAGNOSIS — D50.8 OTHER IRON DEFICIENCY ANEMIA: Primary | ICD-10-CM

## 2025-03-19 DIAGNOSIS — R03.0 ELEVATED BLOOD PRESSURE READING: ICD-10-CM

## 2025-03-19 LAB
PATH REPORT.COMMENTS IMP SPEC: NORMAL
PATH REPORT.FINAL DX SPEC: NORMAL
PHOTO IMAGE: NORMAL

## 2025-03-19 NOTE — PROGRESS NOTES
Outpatient Lactation Visit- copied from infant chart      Antonio Evans  [id number]    Consultation Date: 2025     Reason for Lactation Referral: Initial Lactation Consult    Baby's : 3/13/2025    Baby's Current Age: 6 day old  Baby's Gestational Age: Gestational Age: 40w6d    Primary Care Provider: No Ref-Primary, Physician    Presenting Problem (concerns as stated by parent): Mother and Father accompany infant to visit.     bilitool    MATERNAL HISTORY   History of Breast Surgery: N/A  Breast Changes During Pregnancy: enlargement   Breast Feeding History: breast fed first for a couple weeks then discontinued.   Maternal Meds: daily prenatal vitamin  Pregnancy Complications: Anemia, anxiety   Anesthesia during labor: epidural     MATERNAL ASSESSMENT    Breast Size: average, symmetrical, soft after feeding and filling prior to feeding  Nipple Appearance - Left: Not cracked, with signs of healing, education on further healing techniques provided  Nipple Appearance - Right: Not cracked, with signs of healing, education on further healing techniques provided  Nipple Erectility - Left: erect with stimulation  Nipple Erectility - Right: erect with stimulation  Areolas Compressibility: soft  Nipple Size: average  Special Equipment Used: N/A  Day mother reports milk came in:  Day 3    INFANT ASSESSMENT    Oral Anatomy  Mouth: normal  Palate: normal  Jaw: normal  Tongue: normal  Frenulum: normal   Digital Suck Exam: root    Skin: Pink  Umbilical cord: dried, healing    FEEDING   Feeding Time: aggressive, rhythmic sucking for 20 minutes  Position:  cradle   Effort to Latch: awake and alert, latched easily  Duration of Breast Feeding: Right Breast: 20; Left Breast: 0  Results: successful and satisfied breast feed    Volume of Intake:  Birth Weight: 6# 13.8oz  Hospital discharge weight:  6#12.3 oz  Today's Weight 6# 12.4 oz. After nursing 6# 14.4oz  Total Intake: 2.0 oz off of right breast only.  Output: 4 soil  diapers in last 24 hours, 5 wet diapers in last 24 hours    LATCH Score:   Latch: 2 - Good Latch  Audible Swallowin - Spontaneous & frequent  Type of Nipple: (Breast/Nipple) 2 - Everted  Comfort: 2 - Soft, Nontender  Hold: 2 - No Assist   Total LATCH Score:  10    FEEDING PLAN    Home Feeding Plan: Continue to feed on demand when  elicits feeding cues with deep latch.  Babe should be eating 8-12 times in a 24 hour period.  Exclusivity explained and encouraged in the early weeks to establish breastfeeding and order in milk supply.  Rooming-in encouraged with explanation of the benefits.  Continue to apply expressed breast milk and Lanolin cream to nipples after feedings for healing and comfort.  Postpartum breastfeeding assessment completed and education provided.  Items included in the education are:   proper positioning and latch  effectiveness of feeding  manual expression  handling and storing breastmilk  maintenance of breastfeeding for the first 6 months  sign/symptoms of infant feeding issues requiring referral to qualified health care provider    LACTATION COMMENTS   Successful breastfeed today. Deep latch explained for proper positioning of breast in infant's mouth, maximizing milk transfer and comfort.  Reassurance and encouragement provided in regard to mom's concerns about milk supply.  Follow-up support information provided  Parents plan to keep [place] Well-Child Check with Dr. Chino tomorrow for well child visit.     Mother's Plan of Care:  Mother developed asymptomatic bradycardia postpartum. Mother BP recheck today: 112/72. BP's at home have been 112/68 and 110/64. HR 70's. Has not been checking BP  twice a day at home, checking when remembers. Encourage her to check daily at home or if she is feeling unwell. CBC and BMP ordered.     Anemia in pregnancy and postpartum. Hgb after delivery 7.8. I recommend CBC today. Mother asks if this can be completed at a later time. CBC ordered for  future lab. Next office visit 3/28/25.     Anxiety and Depression, history of PPD with first child. Is not on any medications. Does not feel she needs medications at this time. She reports she feels safe, no thoughts of harming herself or others.  feels safe and will closely monitor her mood. Follow up next week planned.     Face-to-face Time: 60 minutes with assessment and education.    LEDY Brian., R.N., APRN CNP  3/19/2025  10:56 AM

## 2025-03-19 NOTE — PROGRESS NOTES
CHIEF COMPLAINT: Follow up bradycardia/hypertension, anemia and mood-lactation visit     HPI  Carol is a 24 year old , who presents to clinic today for lactation visit with infant.  She is also here to follow-up for bradycardia, hypertension, anemia and mood.    Patient has been checking her blood pressure at home when she remembers.  She reports blood pressures have been in normal range.  She has been feeling tearful, overwhelmed at times.  Has felt some down mood, but nothing to what she felt after her first pregnancy.    Patient is still having light vaginal bleeding, denies passing large clots.    I have reviewed the nursing notes.  I have reviewed allergies, medication list, problem list, and past medical history.    OB HISTORY  OB History    Para Term  AB Living   2 2 2 0 0 2   SAB IAB Ectopic Multiple Live Births   0 0 0 0 2      # Outcome Date GA Lbr Familia/2nd Weight Sex Type Anes PTL Lv   2 Term 26 40w6d 00:09 / 00:25 3.113 kg (6 lb 13.8 oz) F Vag-Spont EPI N OSEI      Name: Antonio GARIBAY Nathan      Apgar1: 7  Apgar5: 8   1 Term 22 39w2d / 00:10 2.73 kg (6 lb 0.3 oz) F Vag-Spont   OSEI      Name: BG DARCIE      Apgar1: 2  Apgar5: 6       ROS  10-Point ROS negative except as noted in HPI    PHYSICAL EXAM  /72   Pulse 72   LMP 2024 (Approximate)   There is no height or weight on file to calculate BMI.  Gen: Well-appearing, well developed, non toxic  Resp: nonlabored, normal effort, no audible wheezing or cough  GI: soft, nontender, no flank pain  MS: moving without difficulty  Psych: appropriate mood and affect      ASSESSMENT/PLAN  Carol Patterson is a 24 year old  here for lactation visit, follow up for elevated blood pressure postpartum in the hospital, anemia and mood check.   1. Other iron deficiency anemia (Primary)  - CBC and Differential; Future  - Basic Metabolic Panel; Future  2. Elevated blood pressure reading  3. Anxiety    Mother's Plan of  Care:  Mother developed asymptomatic bradycardia postpartum in hospital. Did not meet criteria for postpartum hypertension. Mother BP recheck today: 112/72. BP's at home have been 112/68 and 110/64. HR 70's. Has not been checking BP  twice a day at home, checking when remembers. Encourage her to check daily at home or if she is feeling unwell. CBC and BMP ordered. Review symptoms to monitor for.      Anemia in pregnancy and postpartum. Hgb after delivery 7.8. IV venofer 300mg given to patient before discharge on 3/14/25. Encourage iron supplement. I recommend CBC today. Mother asks if this can be completed at a later time. CBC ordered for future lab. Next office visit 3/28/25.      Anxiety and Depression, history of PPD with first child. She is not on any medications. Does not feel she needs medications at this time. She reports she feels safe, no thoughts of harming herself or others.  feels safe and will closely monitor her mood. Follow up next week planned.        Katie Vargas NP  [facility]

## 2025-04-05 ENCOUNTER — NURSE TRIAGE (OUTPATIENT)
Dept: NURSING | Facility: CLINIC | Age: 25
End: 2025-04-05
Payer: COMMERCIAL

## 2025-04-05 NOTE — TELEPHONE ENCOUNTER
Nurse Triage SBAR    Is this a 2nd Level Triage? NO    Situation: Breast Pain    Background: Patient is currently breastfeeding.    Assessment: Patient reports onset of bilateral breast pain since 4/3/25, 6/10 pain prior to ibuprofen and acetaminophen. She reports sharp pain, burning, and tightness. She reports feels breast is empty. Denies fever, breast swelling, breast redness, or severe pain.    Protocol Recommended Disposition:   See HCP Within 4 Hours (Or PCP Triage), See More Appropriate Guideline    Recommendation: According to the protocol, patient should see provider within 4 hours, [place] patient, advised to go to  or ED.  Care advice given. Patient verbalizes understanding and agrees with plan of care.     Mervat Baez RN  04/05/25 6:12 PM  [facility] Nurse Advisor    Reason for Disposition   [1] Breast symptoms (e.g., lump, pain, redness, swelling) AND [2] mother plans to continue breastfeeding or pumping   [1] Breast pain or lump AND [2] mother has chills or feeling overall ill    Additional Information   Negative: Sounds like a life-threatening emergency to the triager   Negative: [1] SEVERE breast pain (e.g., excruciating) AND [2] fever > 103 F (39.4 C)   Negative: Patient sounds very sick or weak to the triager   Negative: [1] Breast looks infected (e.g., spreading redness, feels hot or painful to touch) AND [2] fever 100.4 F (38.0 C) or higher   Negative: [1] SEVERE pain AND [2] not improved after 2 hours of pain medicine and Care Advice   Negative: [1] Breast looks infected (e.g., spreading redness) AND [2] no fever   Negative: [1] Breast or nipple pain AND [2] present > 7 days   Negative: [1] Breast lump AND [2] present > 7 days   Negative: [1] SEVERE breast pain AND [2] fever > 103 F (39.4 C)   Negative: Mother sounds very sick or weak to the triager   Negative: [1] Breast looks infected (spreading redness, feels hot to touch) AND [2] large red area (> 2 in. or 5 cm)    Negative: [1] Breast pain or lump AND [2] mother has fever > 100.4 F (38.0 C)    Protocols used: Postpartum - Breast Pain and [id number], Breastfeeding - Mother's Breast Symptoms or [id number]

## 2025-04-21 ENCOUNTER — MEDICAL CORRESPONDENCE (OUTPATIENT)
Dept: HEALTH INFORMATION MANAGEMENT | Facility: OTHER | Age: 25
End: 2025-04-21
Payer: COMMERCIAL

## 2025-04-29 ENCOUNTER — PRENATAL OFFICE VISIT (OUTPATIENT)
Dept: OBGYN | Facility: OTHER | Age: 25
End: 2025-04-29
Attending: OBSTETRICS & GYNECOLOGY
Payer: COMMERCIAL

## 2025-04-29 ENCOUNTER — PATIENT OUTREACH (OUTPATIENT)
Dept: CARE COORDINATION | Facility: CLINIC | Age: 25
End: 2025-04-29
Payer: COMMERCIAL

## 2025-04-29 VITALS
DIASTOLIC BLOOD PRESSURE: 70 MMHG | WEIGHT: 119 LBS | BODY MASS INDEX: 21.08 KG/M2 | SYSTOLIC BLOOD PRESSURE: 110 MMHG | HEART RATE: 63 BPM

## 2025-04-29 DIAGNOSIS — N89.8 VAGINAL DISCHARGE: Primary | ICD-10-CM

## 2025-04-29 DIAGNOSIS — D50.8 OTHER IRON DEFICIENCY ANEMIA: ICD-10-CM

## 2025-04-29 DIAGNOSIS — R63.0 ANOREXIA: ICD-10-CM

## 2025-04-29 DIAGNOSIS — N89.8 VAGINAL DISCHARGE: ICD-10-CM

## 2025-04-29 DIAGNOSIS — N76.0 ACUTE VAGINITIS: ICD-10-CM

## 2025-04-29 LAB
BACTERIAL VAGINOSIS VAG-IMP: NEGATIVE
CANDIDA DNA VAG QL NAA+PROBE: NOT DETECTED
CANDIDA GLABRATA / CANDIDA KRUSEI DNA: NOT DETECTED
ERYTHROCYTE [DISTWIDTH] IN BLOOD BY AUTOMATED COUNT: 18.2 % (ref 10–15)
HCT VFR BLD AUTO: 35.8 % (ref 35–47)
HGB BLD-MCNC: 11.3 G/DL (ref 11.7–15.7)
MCH RBC QN AUTO: 27.3 PG (ref 26.5–33)
MCHC RBC AUTO-ENTMCNC: 31.6 G/DL (ref 31.5–36.5)
MCV RBC AUTO: 87 FL (ref 78–100)
PLATELET # BLD AUTO: 286 10E3/UL (ref 150–450)
RBC # BLD AUTO: 4.14 10E6/UL (ref 3.8–5.2)
T VAGINALIS DNA VAG QL NAA+PROBE: NOT DETECTED
WBC # BLD AUTO: 6.1 10E3/UL (ref 4–11)

## 2025-04-29 PROCEDURE — 99215 OFFICE O/P EST HI 40 MIN: CPT | Performed by: OBSTETRICS & GYNECOLOGY

## 2025-04-29 PROCEDURE — 99207 PR POST-PARTUM 6 WK VISIT - GICH ONLY: CPT | Performed by: OBSTETRICS & GYNECOLOGY

## 2025-04-29 PROCEDURE — 85014 HEMATOCRIT: CPT | Mod: ZL | Performed by: OBSTETRICS & GYNECOLOGY

## 2025-04-29 PROCEDURE — 36415 COLL VENOUS BLD VENIPUNCTURE: CPT | Mod: ZL | Performed by: OBSTETRICS & GYNECOLOGY

## 2025-04-29 PROCEDURE — 81515 NFCT DS BV&VAGINITIS DNA ALG: CPT | Mod: ZL | Performed by: OBSTETRICS & GYNECOLOGY

## 2025-04-29 ASSESSMENT — PATIENT HEALTH QUESTIONNAIRE - PHQ9
10. IF YOU CHECKED OFF ANY PROBLEMS, HOW DIFFICULT HAVE THESE PROBLEMS MADE IT FOR YOU TO DO YOUR WORK, TAKE CARE OF THINGS AT HOME, OR GET ALONG WITH OTHER PEOPLE: SOMEWHAT DIFFICULT
SUM OF ALL RESPONSES TO PHQ QUESTIONS 1-9: 6
SUM OF ALL RESPONSES TO PHQ QUESTIONS 1-9: 6

## 2025-04-29 ASSESSMENT — EDINBURGH POSTNATAL DEPRESSION SCALE (EPDS)
I HAVE BLAMED MYSELF UNNECESSARILY WHEN THINGS WENT WRONG: NOT VERY OFTEN
I HAVE BEEN SO UNHAPPY THAT I HAVE BEEN CRYING: NO, NEVER
THE THOUGHT OF HARMING MYSELF HAS OCCURRED TO ME: NEVER
I HAVE FELT SCARED OR PANICKY FOR NO GOOD REASON: YES, SOMETIMES
I HAVE LOOKED FORWARD WITH ENJOYMENT TO THINGS: AS MUCH AS I EVER DID
I HAVE BLAMED MYSELF UNNECESSARILY WHEN THINGS WENT WRONG: NOT VERY OFTEN
I HAVE BEEN ABLE TO LAUGH AND SEE THE FUNNY SIDE OF THINGS: AS MUCH AS I ALWAYS COULD
I HAVE FELT SAD OR MISERABLE: NO, NOT AT ALL
I HAVE LOOKED FORWARD WITH ENJOYMENT TO THINGS: AS MUCH AS I EVER DID
I HAVE BEEN ABLE TO LAUGH AND SEE THE FUNNY SIDE OF THINGS: AS MUCH AS I ALWAYS COULD
THE THOUGHT OF HARMING MYSELF HAS OCCURRED TO ME: NEVER
I HAVE FELT SCARED OR PANICKY FOR NO GOOD REASON: YES, SOMETIMES
THINGS HAVE BEEN GETTING ON TOP OF ME: NO, I HAVE BEEN COPING AS WELL AS EVER
I HAVE BEEN SO UNHAPPY THAT I HAVE HAD DIFFICULTY SLEEPING: NOT AT ALL
I HAVE BEEN SO UNHAPPY THAT I HAVE BEEN CRYING: NO, NEVER
I HAVE BEEN SO UNHAPPY THAT I HAVE HAD DIFFICULTY SLEEPING: NOT AT ALL
THINGS HAVE BEEN GETTING ON TOP OF ME: NO, I HAVE BEEN COPING AS WELL AS EVER
TOTAL SCORE: 5
I HAVE BEEN ANXIOUS OR WORRIED FOR NO GOOD REASON: YES, SOMETIMES
I HAVE FELT SAD OR MISERABLE: NO, NOT AT ALL
I HAVE BEEN ANXIOUS OR WORRIED FOR NO GOOD REASON: YES, SOMETIMES

## 2025-04-29 ASSESSMENT — ANXIETY QUESTIONNAIRES
2. NOT BEING ABLE TO STOP OR CONTROL WORRYING: MORE THAN HALF THE DAYS
4. TROUBLE RELAXING: MORE THAN HALF THE DAYS
3. WORRYING TOO MUCH ABOUT DIFFERENT THINGS: MORE THAN HALF THE DAYS
GAD7 TOTAL SCORE: 14
7. FEELING AFRAID AS IF SOMETHING AWFUL MIGHT HAPPEN: MORE THAN HALF THE DAYS
IF YOU CHECKED OFF ANY PROBLEMS ON THIS QUESTIONNAIRE, HOW DIFFICULT HAVE THESE PROBLEMS MADE IT FOR YOU TO DO YOUR WORK, TAKE CARE OF THINGS AT HOME, OR GET ALONG WITH OTHER PEOPLE: SOMEWHAT DIFFICULT
1. FEELING NERVOUS, ANXIOUS, OR ON EDGE: MORE THAN HALF THE DAYS
6. BECOMING EASILY ANNOYED OR IRRITABLE: MORE THAN HALF THE DAYS
5. BEING SO RESTLESS THAT IT IS HARD TO SIT STILL: MORE THAN HALF THE DAYS
7. FEELING AFRAID AS IF SOMETHING AWFUL MIGHT HAPPEN: MORE THAN HALF THE DAYS
8. IF YOU CHECKED OFF ANY PROBLEMS, HOW DIFFICULT HAVE THESE MADE IT FOR YOU TO DO YOUR WORK, TAKE CARE OF THINGS AT HOME, OR GET ALONG WITH OTHER PEOPLE?: SOMEWHAT DIFFICULT

## 2025-04-29 ASSESSMENT — PAIN SCALES - GENERAL: PAINLEVEL_OUTOF10: NO PAIN (0)

## 2025-04-29 NOTE — NURSING NOTE
"Chief Complaint   Patient presents with    Postpartum Care   Patient is here for postpartum, patient had a vaginal delivery, patient is breastfeeding, has a Nexplanon.    Initial /70   Pulse 63   Wt 54 kg (119 lb)   LMP 06/05/2024 (Approximate)   Breastfeeding Yes   BMI 21.08 kg/m   Estimated body mass index is 21.08 kg/m  as calculated from the following:    Height as of 11/14/24: 1.6 m (5' 3\").    Weight as of this encounter: 54 kg (119 lb).  Medication Reconciliation: complete      Aurora Chowdary LPN    "

## 2025-04-29 NOTE — PROGRESS NOTES
Clinic Care Coordination Contact  Reviewed patient's medical chart to check on her overall status.  Recent [place]  Depression scale on 25 indicates sometimes panicky/scared and anxious/worried.      Initiated a phone call to check in with patient on postpartum status. Call rang twice and then went right to busy signal.  Unable to leave a voice message, so texted a message encouraging her to reach back to writer with any concerns, needs for resources.     Also initiated a Secure Chat message to Dr. Perry to alert her to writer's observations.  Will team with care team on supporting patient and her new baby.     Will consider graduating patient off Clinic Care Coordination due to goals met/baby born and unable to reach her/no response.     HERMILA Rivas on 2025 at 10:22 AM

## 2025-05-14 ENCOUNTER — OFFICE VISIT (OUTPATIENT)
Dept: FAMILY MEDICINE | Facility: OTHER | Age: 25
End: 2025-05-14
Payer: COMMERCIAL

## 2025-05-14 ENCOUNTER — RESULTS FOLLOW-UP (OUTPATIENT)
Dept: FAMILY MEDICINE | Facility: OTHER | Age: 25
End: 2025-05-14

## 2025-05-14 VITALS
HEIGHT: 63 IN | SYSTOLIC BLOOD PRESSURE: 108 MMHG | WEIGHT: 120 LBS | DIASTOLIC BLOOD PRESSURE: 70 MMHG | TEMPERATURE: 98.2 F | RESPIRATION RATE: 21 BRPM | OXYGEN SATURATION: 98 % | HEART RATE: 92 BPM | BODY MASS INDEX: 21.26 KG/M2

## 2025-05-14 DIAGNOSIS — R39.15 URINARY URGENCY: Primary | ICD-10-CM

## 2025-05-14 DIAGNOSIS — N89.8 VAGINAL DISCHARGE: ICD-10-CM

## 2025-05-14 LAB
ALBUMIN UR-MCNC: NEGATIVE MG/DL
APPEARANCE UR: CLEAR
BACTERIAL VAGINOSIS VAG-IMP: NEGATIVE
BILIRUB UR QL STRIP: NEGATIVE
CANDIDA DNA VAG QL NAA+PROBE: NOT DETECTED
CANDIDA GLABRATA / CANDIDA KRUSEI DNA: NOT DETECTED
COLOR UR AUTO: YELLOW
GLUCOSE UR STRIP-MCNC: NEGATIVE MG/DL
HGB UR QL STRIP: NEGATIVE
KETONES UR STRIP-MCNC: NEGATIVE MG/DL
LEUKOCYTE ESTERASE UR QL STRIP: NEGATIVE
MUCOUS THREADS #/AREA URNS LPF: PRESENT /LPF
NITRATE UR QL: NEGATIVE
PH UR STRIP: 6 [PH] (ref 5–9)
RBC URINE: 5 /HPF
SP GR UR STRIP: 1.03 (ref 1–1.03)
T VAGINALIS DNA VAG QL NAA+PROBE: NOT DETECTED
UROBILINOGEN UR STRIP-MCNC: NORMAL MG/DL
WBC URINE: 5 /HPF

## 2025-05-14 PROCEDURE — G0463 HOSPITAL OUTPT CLINIC VISIT: HCPCS

## 2025-05-14 PROCEDURE — 81515 NFCT DS BV&VAGINITIS DNA ALG: CPT | Mod: ZL

## 2025-05-14 PROCEDURE — 81001 URINALYSIS AUTO W/SCOPE: CPT | Mod: ZL

## 2025-05-14 ASSESSMENT — PAIN SCALES - GENERAL: PAINLEVEL_OUTOF10: NO PAIN (0)

## 2025-05-14 NOTE — PROGRESS NOTES
ASSESSMENT/PLAN:    I have reviewed the nursing notes.  I have reviewed the findings, diagnosis, plan and need for follow up with the patient.    1. Urinary urgency (Primary)  2. Vaginal discharge  - UA with Microscopic reflex to Culture  - Multiplex Vaginal Panel by PCR    Patient presents with urinary and vaginal symptoms.  Vitals are stable.  Urinalysis was negative for any signs of infection and multiplex vaginal panel was negative.  Discussed with patient that her symptoms may be due to postpartum changes or hormonal changes.  Advised patient that if her symptoms worsen or persist she should follow-up with her gynecologist.    Discussed warning signs/symptoms indicative of need to f/u    Follow up if symptoms persist or worsen or concerns    I explained my diagnostic considerations and recommendations to the patient, who voiced understanding and agreement with the treatment plan. All questions were answered. We discussed potential side effects of any prescribed or recommended therapies, as well as expectations for response to treatments.    Jules Guardado, MIGDALIA CNP  5/14/2025  1:37 PM    HPI:    Carol Patterson is a 24 year old female  who presents to [facility] today for concerns of urinary symptoms    Patient presents with concerns of possible UTI, x 2 weeks    Symptoms: dysuria, urgency, burning, hesitancy, and nausea  Flank Pain or Back Pain: No  Blood in Urine: No  Last Urination: in clinic  Able to Completely Urinate/Void: Yes  Prior UTIs: Yes  Exposures to STIs/STDs: No  Fevers, chills, N/V/D: Yes: nausea  Change in Bowel Habits: No  Vaginal Symptoms or Discharge: white discharge  Recent swimming/use of hot tubs/swimming pools/lakes: No    LMP: 8 weeks postpartum, has nexplanon, has not gotten her period yet    Treatments tried: none    Allergies: reviewed    PCP: none    Past Medical History:   Diagnosis Date    Anemia     Encounter for triage in pregnant patient 11/14/2024    Marginal insertion  of umbilical cord affecting management of mother 2020    Formatting of this note might be different from the original.   Diagnosed on 20 week fetal survey. Will need serial growth ultrasounds and consider  testing starting at 32 weeks gestational age      Nexplanon in place 2021    Formatting of this note might be different from the original.   Insert date: 3/11/2021.   Removal due: 3/11/2024.      Poor fetal growth affecting management of mother in third trimester 2021    Formatting of this note might be different from the original.   Already getting twice a week  testing with biophysical profile and NST.  Will start twice a week umbilical cord doppler studies as well.  If these remain normal, plan to induce at 39 weeks gestational age.  If umbilical cord doppler study abnormal plan to induce before then.      Uncomplicated asthma     Urinary tract infection      History reviewed. No pertinent surgical history.  Social History     Tobacco Use    Smoking status: Never     Passive exposure: Never    Smokeless tobacco: Never   Substance Use Topics    Alcohol use: Not Currently     Comment: 1 beer a night     Current Outpatient Medications   Medication Sig Dispense Refill    acetaminophen (TYLENOL) 325 MG tablet Take 2 tablets (650 mg) by mouth every 6 hours as needed for mild pain. Start after Delivery. (Patient not taking: Reported on 2025) 100 tablet 0    albuterol (PROAIR HFA/PROVENTIL HFA/VENTOLIN HFA) 108 (90 Base) MCG/ACT inhaler Inhale 2 puffs into the lungs (Patient not taking: Reported on 2025)      famotidine (PEPCID) 20 MG tablet Take 1 tablet (20 mg) by mouth 2 times daily. (Patient not taking: Reported on 2025) 30 tablet 1    ibuprofen (ADVIL/MOTRIN) 600 MG tablet Take 1 tablet (600 mg) by mouth every 6 hours as needed for moderate pain. Start after delivery (Patient not taking: Reported on 2025) 60 tablet 0    polyethylene glycol (MIRALAX) 17  "GM/Dose powder Take 17 g (1 Capful) by mouth daily. (Patient not taking: Reported on 5/14/2025) 510 g 0     Allergies   Allergen Reactions    Cats Shortness Of Breath    Dust Mite Extract Shortness Of Breath     Past medical history, past surgical history, current medications and allergies reviewed and accurate to the best of my knowledge.      ROS:  Refer to HPI    /70   Pulse 92   Temp 98.2  F (36.8  C) (Tympanic)   Resp 21   Ht 1.6 m (5' 3\")   Wt 54.4 kg (120 lb)   LMP 06/05/2024 (Approximate)   SpO2 98%   Breastfeeding Yes   BMI 21.26 kg/m      EXAM:  General Appearance: Well appearing 24 year old female, appropriate appearance for age. No acute distress   Neck: supple without adenopathy  Respiratory: normal chest wall and respirations.  Normal effort.  Clear to auscultation bilaterally, no wheezing, crackles or rhonchi.  No increased work of breathing.  No cough appreciated.  Cardiac: RRR with no murmurs  : Absent CVA tenderness to palpation.  No abnormalities noted with external vagina on exam, chaperone present during exam  Musculoskeletal:  Equal movement of bilateral upper extremities.  Equal movement of bilateral lower extremities.  Normal gait.    Dermatological: no rashes noted of exposed skin  Neuro: Alert and oriented to person, place, and time.    Psychological: normal affect, alert, oriented, and pleasant.     Labs:  Results for orders placed or performed in visit on 05/14/25   UA with Microscopic reflex to Culture     Status: Abnormal    Specimen: Urine, Clean Catch   Result Value Ref Range    Color Urine Yellow Colorless, Straw, Light Yellow, Yellow    Appearance Urine Clear Clear    Glucose Urine Negative Negative mg/dL    Bilirubin Urine Negative Negative    Ketones Urine Negative Negative mg/dL    Specific Gravity Urine 1.029 1.000 - 1.030    Blood Urine Negative Negative    pH Urine 6.0 5.0 - 9.0    Protein Albumin Urine Negative Negative mg/dL    Urobilinogen Urine Normal " Normal mg/dL    Nitrite Urine Negative Negative    Leukocyte Esterase Urine Negative Negative    Mucus Urine Present (A) None Seen /LPF    RBC Urine 5 (H) <=2 /HPF    WBC Urine 5 <=5 /HPF    Narrative    Urine Culture not indicated   Multiplex Vaginal Panel by PCR     Status: Normal    Specimen: Vagina; Swab   Result Value Ref Range    Bacterial Vaginosis Organism DNA Negative Negative    Candida Group DNA Not Detected Not Detected    Candida glabrata / Leigh krusei DNA Not Detected Not Detected    Trichomonas vaginalis DNA Not Detected Not Detected    Narrative    The Xpert  Xpress MVP test, performed on the [employer], is an automated, qualitative in vitro diagnostic test for the detection of DNA targets from anaerobic bacteria associated with bacterial vaginosis, Candida species associated with vulvovaginal candidiasis, and Trichomonas vaginalis. The assay uses clinician-collected and self-collected vaginal swabs from patients who are symptomatic for vaginitis/ vaginosis. The Xpert  Xpress MVP test utilizes real-time polymerase chain reaction (PCR) for the amplification of specific DNA targets and utilizes fluorogenic target-specific hybridization probes to detect and differentiate DNA. It is intended to aid in the diagnosis of vaginal infections in women with a clinical presentation consistent with bacterial vaginosis, vulvovaginal candidiasis, or trichomoniasis.   The assay targets three anaerobic microorgansims that are associated with bacterial vaginosis (BV). Other organisms that are not detected by the Xpert  Xpress MVP test have also been reported to be associated with BV. The BV organism and Candida species targets of the Xpert  Xpress MVP test can be commensal in women; positive results must be considered in conjunction with other clinical and patient information to determine the disease status.

## 2025-05-14 NOTE — NURSING NOTE
"Chief Complaint   Patient presents with    Vaginal Problem     Patient here for bright yellow urine, odor, abdominal pain and dysuria with urgency x2 weeks.     Initial /70   Pulse 92   Temp 98.2  F (36.8  C) (Tympanic)   Resp 21   Ht 1.6 m (5' 3\")   Wt 54.4 kg (120 lb)   LMP 06/05/2024 (Approximate)   SpO2 98%   Breastfeeding Yes   BMI 21.26 kg/m   Estimated body mass index is 21.26 kg/m  as calculated from the following:    Height as of this encounter: 1.6 m (5' 3\").    Weight as of this encounter: 54.4 kg (120 lb).  Medication Review: complete    The next two questions are to help us understand your food security.  If you are feeling you need any assistance in this area, we have resources available to support you today.          5/14/2025   SDOH- Food Insecurity   Within the past 12 months, did you worry that your food would run out before you got money to buy more? N   Within the past 12 months, did the food you bought just not last and you didn t have money to get more? N         Health Care Directive:  Patient does not have a Health Care Directive: Discussed advance care planning with patient; however, patient declined at this time.    Matilde Brian LPN      "

## 2025-05-27 ENCOUNTER — NURSE TRIAGE (OUTPATIENT)
Dept: NURSING | Facility: CLINIC | Age: 25
End: 2025-05-27
Payer: COMMERCIAL

## 2025-05-27 ENCOUNTER — TELEPHONE (OUTPATIENT)
Dept: FAMILY MEDICINE | Facility: OTHER | Age: 25
End: 2025-05-27
Payer: COMMERCIAL

## 2025-05-27 NOTE — TELEPHONE ENCOUNTER
Attempted to call both numbers of file. Both ring and then turn to busy signal. Will send mychart with recommendations.    Cassie Whitley RN on 5/27/2025 at 12:47 PM

## 2025-05-27 NOTE — TELEPHONE ENCOUNTER
Pt has cold symptoms and is breast feeding.  She would like to know if it is ok to take cold medication.  Please call.    Juan José Martinez on 5/27/2025 at 9:20 AM

## 2025-05-27 NOTE — TELEPHONE ENCOUNTER
Medications that are safe to take during breast-feeding include:    Tylenol and ibuprofen as needed for sore throat, nasal congestion, headache or fever/chills body aches.    Dextromethorphan (for cough) is safe during breast-feeding.    For sore throat member natural remedies such as drinking hot tea with lemon, honey and gargling with salt water to help soothe symptoms.    To help with congestion I would recommend pot saline rinses as this would be safest.    It is also considered safe to use nasal spray such as nasal saline twice a day as needed for congestion or Flonase nasal spray as needed twice a day for congestion.    Afrin nasal spray is considered safe to use for 2 to 3 days maximum.    The main concern with oral allergy medicines and decongestions is that can cause a dip in breast milk supply.  I would recommend avoiding Sudafed-this can cause more increased dip in milk supply and infant irritability.    Loratadine (oral allergy medicine to help with congestion) can cause a small dip in milk supply but has no harmful effect to infant.  If she were to need to take this I would recommend only taking it once a day for a day or 2.

## 2025-05-27 NOTE — PROGRESS NOTES
[facility]  6 week Postpartum Visit Note      S:   Ms. Carol Patterson is a 24 year old  here for her 6-week postpartum checkup.   - Had a  on 3/13/25 at 40w6d by Dr. Velasquez.   - Infant gender: female, weight 6 pounds 14 oz.   - Feeding Method: breast. Complications reported with feeding: denies.   - Bleeding: none currently  Duration: several weeks. Menses resumed: no   - Bowel/Urinary problems: Doesn't feel like she can sense when she is urinating  - Has been noticing a vaginal odor and burning with urination. Feels gross.     - Carol is concerned about her weight  - Has a history of anorexia that was not controlled until she became pregnant. Hasn't discussed this before. Was able to focus on the health of her baby and gain weight during pregnancy. Since delivery, has been eating less and noticing more anorexic tendencies again. Is nervous to discuss this with her partner.     [place]  Depression score:   [place] Depression Scale  Thoughts of Harming Self: (Patient-Rptd) Never  Total Score: (Patient-Rptd) 5     - Contraception Planned: Nexplanon, already placed   - She has not had intercourse since delivery.  - Cervical cancer screening: Hasn't had a pap before and declines today. Doesn't tolerate pelvic exams well due to SA history     O:   Vitals:    25 1340   BP: 110/70   Pulse: 63   Weight: 54 kg (119 lb)      Gen: Well-appearing, NAD   Psych: Appears well groomed, maintains eye contact, answers questions appropriately, bright affect  Abd: soft, non tender, non distended    Pelvic: Declined examination      A/P:   Ms. Carol Patterson is a 24 year old  here for 6 week postpartum visit after a . Doing well overall.     # Anorexia  - Discussed symptoms today and her history of anorexia in the past  - She would be willing to see a woman in our behavioral health department, urgent referral placed today  - Recommended that she discuss this with  her  and let him know what she is feeling    # Vaginal discharge, odor  - Carol self collected an MVP swab today    # Anemia  - Hgb 7.8 after delivery, will recheck today    - Contraception: Nexplanon already placed   - Feeding: breast feeding is going well   - Cervical cancer screening: declines at this time   - Follow-up: 2 weeks for a follow up visit     60 minutes was spent reviewing the electronic medical record, face to face time with Carol, counseling and coordinating care with the patient, family and/or caregivers and post visit documentation.     Kathleen Perry MD    Answers submitted by the patient for this visit:  Patient Health Questionnaire (Submitted on 4/29/2025)  If you checked off any problems, how difficult have these problems made it for you to do your work, take care of things at home, or get along with other people?: Somewhat difficult  PHQ9 TOTAL SCORE: 6  Patient Health Questionnaire (G7) (Submitted on 4/29/2025)  RAMON 7 TOTAL SCORE: 14

## 2025-05-28 ENCOUNTER — OFFICE VISIT (OUTPATIENT)
Dept: OBGYN | Facility: OTHER | Age: 25
End: 2025-05-28
Payer: COMMERCIAL

## 2025-05-28 PROCEDURE — G0463 HOSPITAL OUTPT CLINIC VISIT: HCPCS | Performed by: REGISTERED NURSE

## 2025-05-28 NOTE — TELEPHONE ENCOUNTER
Unable to reach patient. Patient has not yet read [employer] message.  Cassie Whitley RN on 5/28/2025 at 3:28 PM

## 2025-05-28 NOTE — LACTATION NOTE
Carol is here with questions on trying to boost her milk supply. She was recently sick and feels like her milk might have decreased during that time. She does feel like it is increasing since she's been feeling better.    Carol states she is nursing about 5-6 times per day and that Antonio appears satisfied post breastfeeding sessions. Antonio was last in the clinic on 4/21/25 and her weight was   9 lbs. Her weight today is 11 lbs 12 oz indicating an adequate weight gain since then.    Information and instructions provided on ways to increase supply such as feeding more frequently, power pumping, and hand stimulation.

## 2025-05-29 ENCOUNTER — RESULTS FOLLOW-UP (OUTPATIENT)
Dept: FAMILY MEDICINE | Facility: OTHER | Age: 25
End: 2025-05-29

## 2025-05-29 ENCOUNTER — OFFICE VISIT (OUTPATIENT)
Dept: FAMILY MEDICINE | Facility: OTHER | Age: 25
End: 2025-05-29
Payer: COMMERCIAL

## 2025-05-29 VITALS
OXYGEN SATURATION: 98 % | HEIGHT: 63 IN | BODY MASS INDEX: 21.09 KG/M2 | HEART RATE: 55 BPM | WEIGHT: 119 LBS | SYSTOLIC BLOOD PRESSURE: 112 MMHG | TEMPERATURE: 98 F | RESPIRATION RATE: 19 BRPM | DIASTOLIC BLOOD PRESSURE: 74 MMHG

## 2025-05-29 DIAGNOSIS — J02.9 SORE THROAT: ICD-10-CM

## 2025-05-29 DIAGNOSIS — J02.9 ACUTE VIRAL PHARYNGITIS: Primary | ICD-10-CM

## 2025-05-29 DIAGNOSIS — R50.9 FEVER IN ADULT: ICD-10-CM

## 2025-05-29 LAB — S PYO DNA THROAT QL NAA+PROBE: NOT DETECTED

## 2025-05-29 PROCEDURE — 87651 STREP A DNA AMP PROBE: CPT | Mod: ZL

## 2025-05-29 PROCEDURE — G0463 HOSPITAL OUTPT CLINIC VISIT: HCPCS

## 2025-05-29 ASSESSMENT — ASTHMA QUESTIONNAIRES
QUESTION_4 LAST FOUR WEEKS HOW OFTEN HAVE YOU USED YOUR RESCUE INHALER OR NEBULIZER MEDICATION (SUCH AS ALBUTEROL): NOT AT ALL
QUESTION_5 LAST FOUR WEEKS HOW WOULD YOU RATE YOUR ASTHMA CONTROL: COMPLETELY CONTROLLED
QUESTION_3 LAST FOUR WEEKS HOW OFTEN DID YOUR ASTHMA SYMPTOMS (WHEEZING, COUGHING, SHORTNESS OF BREATH, CHEST TIGHTNESS OR PAIN) WAKE YOU UP AT NIGHT OR EARLIER THAN USUAL IN THE MORNING: NOT AT ALL
QUESTION_1 LAST FOUR WEEKS HOW MUCH OF THE TIME DID YOUR ASTHMA KEEP YOU FROM GETTING AS MUCH DONE AT WORK, SCHOOL OR AT HOME: NONE OF THE TIME
ACT_TOTALSCORE: 25
QUESTION_2 LAST FOUR WEEKS HOW OFTEN HAVE YOU HAD SHORTNESS OF BREATH: NOT AT ALL

## 2025-05-29 ASSESSMENT — PATIENT HEALTH QUESTIONNAIRE - PHQ9
SUM OF ALL RESPONSES TO PHQ QUESTIONS 1-9: 0
10. IF YOU CHECKED OFF ANY PROBLEMS, HOW DIFFICULT HAVE THESE PROBLEMS MADE IT FOR YOU TO DO YOUR WORK, TAKE CARE OF THINGS AT HOME, OR GET ALONG WITH OTHER PEOPLE: NOT DIFFICULT AT ALL
SUM OF ALL RESPONSES TO PHQ QUESTIONS 1-9: 0

## 2025-05-29 ASSESSMENT — PAIN SCALES - GENERAL: PAINLEVEL_OUTOF10: MILD PAIN (3)

## 2025-05-29 NOTE — PROGRESS NOTES
ASSESSMENT/PLAN:    I have reviewed the nursing notes.  I have reviewed the findings, diagnosis, plan and need for follow up with the patient.    1. Acute viral pharyngitis (Primary)  2. Sore throat  3. Fever in adult  - Group A Streptococcus PCR Throat Swab    Patient presents with a sore throat.  Vitals are stable and she appears nontoxic.  Strep test was negative. Discussed with patient that symptoms and exam are consistent with viral illness.  Discussed that symptomatic treatment is appropriate but not with antibiotics. Discussed symptomatic treatment - Encouraged fluids, salt water gargles, honey (only if greater than 1 year in age due to risk of botulism), elevation, humidifier, sinus rinse/netti pot, lozenges, tea, topical vapor rub, popsicles, rest, etc. May use over-the-counter Tylenol or ibuprofen PRN.    Discussed warning signs/symptoms indicative of need to f/u    Follow up if symptoms persist or worsen or concerns    I explained my diagnostic considerations and recommendations to the patient, who voiced understanding and agreement with the treatment plan. All questions were answered. We discussed potential side effects of any prescribed or recommended therapies, as well as expectations for response to treatments.    Jules Guardado, MIGDALIA CNP  5/29/2025  12:11 PM    HPI:    Carol Patterson is a 24 year old female  who presents to [facility] today for concerns of sore throat    sore throat, x 2 days duration.    YES: + Difficulty swallowing, breathing or handling own secretions.   YES: + fevers or chills. Fever, highest reported temperature: unknown.   YES: + allergy/URI Symptoms.   YES: + Otalgia  YES: + Headache.   YES: + Congestion (head/nasal/chest).   YES: + Cough/Productive Cough.   YES: + Post Nasal Drip  No Sinus Pain/Pressure.   YES: + Myalgias.   YES: + nausea  No rash.     No change to bowel or bladder habits   YES: + fatigue/energy level changes  YES: + change to appetite/fluid  intak    Any prior HEENT surgery for removal of tonsils or adenoids: No  Recent antibiotic use: No  Exposure to sick contacts including: strep, influenza, COVID, other bacterial or viral illnesses - strep  Exposure site: friends  Treatments tried: ibuprofen and benadryl    Additional symptoms to report: none      Past Medical History:   Diagnosis Date    Anemia     Encounter for triage in pregnant patient 2024    Marginal insertion of umbilical cord affecting management of mother 2020    Formatting of this note might be different from the original.   Diagnosed on 20 week fetal survey. Will need serial growth ultrasounds and consider  testing starting at 32 weeks gestational age      Nexplanon in place 2021    Formatting of this note might be different from the original.   Insert date: 3/11/2021.   Removal due: 3/11/2024.      Poor fetal growth affecting management of mother in third trimester 2021    Formatting of this note might be different from the original.   Already getting twice a week  testing with biophysical profile and NST.  Will start twice a week umbilical cord doppler studies as well.  If these remain normal, plan to induce at 39 weeks gestational age.  If umbilical cord doppler study abnormal plan to induce before then.      Uncomplicated asthma     Urinary tract infection      History reviewed. No pertinent surgical history.  Social History     Tobacco Use    Smoking status: Never     Passive exposure: Never    Smokeless tobacco: Never   Substance Use Topics    Alcohol use: Not Currently     Comment: 1 beer a night     Current Outpatient Medications   Medication Sig Dispense Refill    acetaminophen (TYLENOL) 325 MG tablet Take 2 tablets (650 mg) by mouth every 6 hours as needed for mild pain. Start after Delivery. (Patient not taking: Reported on 2025) 100 tablet 0    albuterol (PROAIR HFA/PROVENTIL HFA/VENTOLIN HFA) 108 (90 Base) MCG/ACT inhaler Inhale 2  "puffs into the lungs (Patient not taking: Reported on 5/29/2025)      famotidine (PEPCID) 20 MG tablet Take 1 tablet (20 mg) by mouth 2 times daily. (Patient not taking: Reported on 5/29/2025) 30 tablet 1    ibuprofen (ADVIL/MOTRIN) 600 MG tablet Take 1 tablet (600 mg) by mouth every 6 hours as needed for moderate pain. Start after delivery (Patient not taking: Reported on 5/29/2025) 60 tablet 0    polyethylene glycol (MIRALAX) 17 GM/Dose powder Take 17 g (1 Capful) by mouth daily. (Patient not taking: Reported on 5/29/2025) 510 g 0     Allergies   Allergen Reactions    Cats Shortness Of Breath    Dust Mite Extract Shortness Of Breath     Past medical history, past surgical history, current medications and allergies reviewed and accurate to the best of my knowledge.      ROS:  Refer to HPI    /74 (BP Location: Right arm, Patient Position: Sitting, Cuff Size: Adult Regular)   Pulse 55   Temp 98  F (36.7  C) (Tympanic)   Resp 19   Ht 1.6 m (5' 3\")   Wt 54 kg (119 lb)   LMP 06/05/2024 (Approximate)   SpO2 98%   BMI 21.08 kg/m      EXAM:  General Appearance: Well appearing 24 year old female, appropriate appearance for age. No acute distress   Ears: Left TM intact, translucent with bony landmarks appreciated, no erythema, no effusion, no bulging, no purulence.  Right TM intact, translucent with bony landmarks appreciated, no erythema, no effusion, no bulging, no purulence.  Left auditory canal clear.  Right auditory canal clear.  Normal external ears, non tender.  Eyes: conjunctivae normal without erythema or irritation, corneas clear, no drainage or crusting, no eyelid swelling, pupils equal   Oropharynx: moist mucous membranes, posterior pharynx with mild erythema, tonsils symmetric and 2+, mild erythema, no exudates or petechiae, no post nasal drip seen, no trismus, voice clear.    Nose:  Bilateral nares: no erythema, no edema, no drainage or congestion   Neck: supple without adenopathy  Respiratory: " normal chest wall and respirations.  Normal effort.  Clear to auscultation bilaterally, no wheezing, crackles or rhonchi.  No increased work of breathing.  No cough appreciated.  Cardiac: RRR with no murmurs  Musculoskeletal:  Equal movement of bilateral upper extremities.  Equal movement of bilateral lower extremities.  Normal gait.    Dermatological: no rashes noted of exposed skin  Neuro: Alert and oriented to person, place, and time.    Psychological: normal affect, alert, oriented, and pleasant.     Labs:  Results for orders placed or performed in visit on 05/29/25   Group A Streptococcus PCR Throat Swab     Status: Normal    Specimen: Throat; Swab   Result Value Ref Range    Group A strep by PCR Not Detected Not Detected    Narrative    The Xpert Xpress Strep A test, performed on the [employer], is a rapid, qualitative in vitro diagnostic test for the detection of Streptococcus pyogenes (Group A ß-hemolytic Streptococcus, Strep A) in throat swab specimens from patients with signs and symptoms of pharyngitis. The Xpert Xpress Strep A test can be used as an aid in the diagnosis of Group A Streptococcal pharyngitis. The assay is not intended to monitor treatment for Group A Streptococcus infections. The Xpert Xpress Strep A test utilizes an automated real-time polymerase chain reaction (PCR) to detect Streptococcus pyogenes DNA.

## 2025-05-29 NOTE — TELEPHONE ENCOUNTER
Noted that patient read [employer] message. Encounter will be closed.     Shanika Nur RN on 5/29/2025 at 1:27 PM

## 2025-05-29 NOTE — PROGRESS NOTES
"Chief Complaint   Patient presents with    Pharyngitis    Cough    Headache   Patient is here to be seen for sore throat that began 3 days ago.    FOOD SECURITY SCREENING QUESTIONS  Hunger Vital Signs:  Within the past 12 months we worried whether our food would run out before we got money to buy more. Never  Within the past 12 months the food we bought just didn't last and we didn't have money to get more. Never  Ana Lilia Cazares 5/29/2025 12:08 PM      Initial /74 (BP Location: Right arm, Patient Position: Sitting, Cuff Size: Adult Regular)   Pulse 55   Temp 98  F (36.7  C) (Tympanic)   Resp 19   Ht 1.6 m (5' 3\")   Wt 54 kg (119 lb)   LMP 06/05/2024 (Approximate)   SpO2 98%   BMI 21.08 kg/m   Estimated body mass index is 21.08 kg/m  as calculated from the following:    Height as of this encounter: 1.6 m (5' 3\").    Weight as of this encounter: 54 kg (119 lb).  Medication Reconciliation: complete    Ana Lilia Cazares   "

## 2025-06-06 ENCOUNTER — MEDICAL CORRESPONDENCE (OUTPATIENT)
Dept: HEALTH INFORMATION MANAGEMENT | Facility: OTHER | Age: 25
End: 2025-06-06
Payer: COMMERCIAL

## 2025-07-17 ENCOUNTER — HOSPITAL ENCOUNTER (EMERGENCY)
Facility: OTHER | Age: 25
Discharge: HOME | End: 2025-07-17
Attending: FAMILY MEDICINE
Payer: COMMERCIAL

## 2025-07-17 VITALS
HEIGHT: 63 IN | TEMPERATURE: 97.8 F | HEART RATE: 56 BPM | RESPIRATION RATE: 17 BRPM | BODY MASS INDEX: 21.09 KG/M2 | OXYGEN SATURATION: 98 % | WEIGHT: 119 LBS

## 2025-07-17 DIAGNOSIS — J06.9 VIRAL URI: ICD-10-CM

## 2025-07-17 DIAGNOSIS — R53.83 EXHAUSTION: ICD-10-CM

## 2025-07-17 PROCEDURE — 99282 EMERGENCY DEPT VISIT SF MDM: CPT | Performed by: FAMILY MEDICINE

## 2025-07-17 ASSESSMENT — ENCOUNTER SYMPTOMS
COUGH: 0
HEADACHES: 0
HEMATURIA: 0
EYE REDNESS: 0
NAUSEA: 0
DIZZINESS: 1
VOMITING: 0
APPETITE CHANGE: 0
CHILLS: 0
SHORTNESS OF BREATH: 0
RHINORRHEA: 0
ACTIVITY CHANGE: 0
SORE THROAT: 0
FEVER: 0
DIARRHEA: 0
NECK STIFFNESS: 0
ARTHRALGIAS: 0
DYSURIA: 0
MYALGIAS: 0
FATIGUE: 0
ABDOMINAL PAIN: 0

## 2025-07-17 ASSESSMENT — ACTIVITIES OF DAILY LIVING (ADL): ADLS_ACUITY_SCORE: 41

## 2025-07-17 NOTE — ED TRIAGE NOTES
"Pt here via private car.  Pt states she has brandon dealing with a cold.  Around 2100 pt felt dizzy, lightheaded, head \"felt light\".  Pt did drink some water and felt okay.  Pt also states she hasn't slept much due to the pt's baby not feeling well.   Since pt is here with her baby, she thought she would get checked out to make sure her not feeling well isn't affecting the baby.        "

## 2025-07-17 NOTE — DISCHARGE INSTRUCTIONS
Try to eat and drink throughout the day as able.  I note is hard caring for children at home.  Try also to get some rest.

## 2025-07-17 NOTE — ED PROVIDER NOTES
History     Chief Complaint   Patient presents with    Dizziness     HPI  Carol Patterson is a 24 year old female who initially presented with her daughter.  However, she was having some episodes of feeling dizzy and thought she should get checked out since she is here.  Patient has a 4-year-old and 4-month-old at home.  She is home with them during the day.  Everyone has been sick with viral upper respiratory symptoms.  Infant developed vomiting and diarrhea today.  Patient herself has not developed the symptoms yet.  However, due to caring for the children at home she did not really eat much today.  She thinks she drink maybe 1 glass of water.  When they went to lie down for bed last night she was feeling a little dizzy and lightheaded.  Patient's partner/father of her children, brought her into the emergency room and so she did not drive herself here.  She has no fever, neck stiffness, rashes.  No other warning signs or symptoms.  No neurologic changes.    Allergies:  Allergies   Allergen Reactions    Cats Shortness Of Breath    Dust Mite Extract Shortness Of Breath       Problem List:    Patient Active Problem List    Diagnosis Date Noted    Anxiety 01/30/2025     Priority: Medium    Nexplanon in place 03/11/2021     Priority: Medium     Inserted on 03/14/25   Nexplanon Lot number: [id number]  Expiration date: 2027-02  S/N: [id number]  GTIN: [id number]      Anemia during pregnancy in third trimester 01/21/2021     Priority: Medium    Allergic rhinoconjunctivitis 10/25/2016     Priority: Medium    Exercise-induced asthma 04/20/2016     Priority: Medium     Formatting of this note might be different from the original.   Patient thinks she outgrew this.  She no longer uses any kind of inhaler and never has any symptoms of this.      Encopresis 09/02/2011     Priority: Medium     Formatting of this note might be different from the original.  Updated per 10/1/17 O import Formatting of this note  might be different from the original.  Overview: Updated per 10/1/17 IMO import  Formatting of this note might be different from the original.   Updated per 10/1/17 IMO import  Formatting of this note might be different from the original.   Overview:    Updated per 10/1/17 IMO import          Past Medical History:    Past Medical History:   Diagnosis Date    Anemia     Encounter for triage in pregnant patient 11/14/2024    Marginal insertion of umbilical cord affecting management of mother 09/24/2020    Nexplanon in place 03/11/2021    Poor fetal growth affecting management of mother in third trimester 01/04/2021    Uncomplicated asthma     Urinary tract infection        Past Surgical History:    No past surgical history on file.    Family History:    Family History   Problem Relation Age of Onset    No Known Problems Father     Other - See Comments Brother         von willebrand disease type 1    Autism Spectrum Disorder Brother     Hearing Loss Brother     Attention Deficit Disorder Brother     Von Willebrand disease Brother     Skin Cancer Maternal Grandfather     Diabetes Type 2  Paternal Grandmother        Social History:  Marital Status:   [2]  Social History     Tobacco Use    Smoking status: Never     Passive exposure: Never    Smokeless tobacco: Never   Vaping Use    Vaping status: Never Used   Substance Use Topics    Alcohol use: Not Currently     Comment: 1 beer a night    Drug use: Never        Medications:    acetaminophen (TYLENOL) 325 MG tablet  albuterol (PROAIR HFA/PROVENTIL HFA/VENTOLIN HFA) 108 (90 Base) MCG/ACT inhaler  famotidine (PEPCID) 20 MG tablet  ibuprofen (ADVIL/MOTRIN) 600 MG tablet  polyethylene glycol (MIRALAX) 17 GM/Dose powder          Review of Systems   Constitutional:  Negative for activity change, appetite change, chills, fatigue and fever.   HENT:  Negative for congestion, rhinorrhea and sore throat.    Eyes:  Negative for redness.   Respiratory:  Negative for cough and  "shortness of breath.    Cardiovascular:  Negative for chest pain.   Gastrointestinal:  Negative for abdominal pain, diarrhea, nausea and vomiting.   Genitourinary:  Negative for dysuria and hematuria.   Musculoskeletal:  Negative for arthralgias, myalgias and neck stiffness.   Skin:  Negative for rash.   Neurological:  Positive for dizziness. Negative for headaches.   All other systems reviewed and are negative.      Physical Exam   Pulse: 65  Temp: 97.8  F (36.6  C)  Resp: 17  Height: 160 cm (5' 3\")  Weight: 54 kg (119 lb)  SpO2: 99 %      Physical Exam  Vitals and nursing note reviewed.   Constitutional:       General: She is not in acute distress.     Appearance: Normal appearance. She is not diaphoretic.   HENT:      Head: Normocephalic and atraumatic.      Mouth/Throat:      Mouth: Mucous membranes are moist.   Eyes:      General: No scleral icterus.     Conjunctiva/sclera: Conjunctivae normal.   Cardiovascular:      Rate and Rhythm: Normal rate and regular rhythm.      Heart sounds: Normal heart sounds.   Pulmonary:      Effort: No respiratory distress.      Breath sounds: Normal breath sounds.   Abdominal:      General: Abdomen is flat.      Palpations: Abdomen is soft.   Musculoskeletal:      Cervical back: Neck supple.   Skin:     General: Skin is warm.      Findings: No rash.   Neurological:      General: No focal deficit present.      Mental Status: She is alert. Mental status is at baseline.         ED Course        Procedures              Critical Care time:  none     None         No results found for this or any previous visit (from the past 24 hours).    Medications - No data to display    Assessments & Plan (with Medical Decision Making)     I have reviewed the nursing notes.    I have reviewed the findings, diagnosis, plan and need for follow up with the patient.           Medical Decision Making  The patient's presentation was of low complexity (an acute and uncomplicated illness or injury).    The " patient's evaluation involved:  history and exam without other MDM data elements    The patient's management necessitated only low risk treatment.        New Prescriptions    No medications on file       Final diagnoses:   Exhaustion   Viral URI   Patient presents with poor sleep at home.  Did not eat or drink much throughout the day as she was caring for sick children.  Felt generally very fatigued and a little bit dizzy before bed.  It sounds as though they are having financial stressors.  Discussed options with patient including lab testing etc.  However, she felt reassured after conversation that her physical exam is normal and that being the mother of a 4-year-old and an infant is very challenging especially when they are ill and she is caring for them.  Discussed that if she is having worsening signs or symptoms to return to the emergency room.  She has previously had low hemoglobin, 7.8 during pregnancy.  This was repeated after she had her baby and was 11.3.    7/17/2025   [facility]       Anita Salgado DO  07/17/25 0413

## 2025-08-06 ENCOUNTER — OFFICE VISIT (OUTPATIENT)
Dept: OBGYN | Facility: OTHER | Age: 25
End: 2025-08-06
Payer: MEDICAID

## 2025-08-06 VITALS
HEART RATE: 86 BPM | SYSTOLIC BLOOD PRESSURE: 120 MMHG | BODY MASS INDEX: 21.33 KG/M2 | DIASTOLIC BLOOD PRESSURE: 80 MMHG | RESPIRATION RATE: 20 BRPM | HEIGHT: 63 IN | TEMPERATURE: 98.4 F | OXYGEN SATURATION: 97 % | WEIGHT: 120.4 LBS

## 2025-08-06 DIAGNOSIS — Z12.4 CERVICAL CANCER SCREENING: ICD-10-CM

## 2025-08-06 DIAGNOSIS — R82.998 DARK BROWN URINE: Primary | ICD-10-CM

## 2025-08-06 DIAGNOSIS — R10.2 PELVIC PAIN IN FEMALE: ICD-10-CM

## 2025-08-06 DIAGNOSIS — R42 DIZZINESS: ICD-10-CM

## 2025-08-06 LAB
ALBUMIN SERPL BCG-MCNC: 4.7 G/DL (ref 3.5–5.2)
ALBUMIN UR-MCNC: NEGATIVE MG/DL
ALP SERPL-CCNC: 97 U/L (ref 40–150)
ALT SERPL W P-5'-P-CCNC: 13 U/L (ref 0–50)
ANION GAP SERPL CALCULATED.3IONS-SCNC: 8 MMOL/L (ref 7–15)
APPEARANCE UR: CLEAR
AST SERPL W P-5'-P-CCNC: 15 U/L (ref 0–45)
BACTERIAL VAGINOSIS VAG-IMP: NEGATIVE
BILIRUB SERPL-MCNC: 0.4 MG/DL
BILIRUB UR QL STRIP: NEGATIVE
BUN SERPL-MCNC: 9.2 MG/DL (ref 6–20)
CALCIUM SERPL-MCNC: 9.2 MG/DL (ref 8.8–10.4)
CANDIDA DNA VAG QL NAA+PROBE: NOT DETECTED
CANDIDA GLABRATA / CANDIDA KRUSEI DNA: NOT DETECTED
CHLORIDE SERPL-SCNC: 103 MMOL/L (ref 98–107)
COLOR UR AUTO: YELLOW
CREAT SERPL-MCNC: 0.69 MG/DL (ref 0.51–0.95)
EGFRCR SERPLBLD CKD-EPI 2021: >90 ML/MIN/1.73M2
ERYTHROCYTE [DISTWIDTH] IN BLOOD BY AUTOMATED COUNT: 13.2 % (ref 10–15)
GLUCOSE SERPL-MCNC: 69 MG/DL (ref 70–99)
GLUCOSE UR STRIP-MCNC: NEGATIVE MG/DL
HCO3 SERPL-SCNC: 28 MMOL/L (ref 22–29)
HCT VFR BLD AUTO: 36.7 % (ref 35–47)
HGB BLD-MCNC: 12 G/DL (ref 11.7–15.7)
HGB UR QL STRIP: NEGATIVE
KETONES UR STRIP-MCNC: NEGATIVE MG/DL
LEUKOCYTE ESTERASE UR QL STRIP: NEGATIVE
MCH RBC QN AUTO: 30.6 PG (ref 26.5–33)
MCHC RBC AUTO-ENTMCNC: 32.7 G/DL (ref 31.5–36.5)
MCV RBC AUTO: 94 FL (ref 78–100)
NITRATE UR QL: NEGATIVE
PH UR STRIP: 7 [PH] (ref 5–9)
PLATELET # BLD AUTO: 271 10E3/UL (ref 150–450)
POTASSIUM SERPL-SCNC: 3.6 MMOL/L (ref 3.4–5.3)
PROT SERPL-MCNC: 7.4 G/DL (ref 6.4–8.3)
RBC # BLD AUTO: 3.92 10E6/UL (ref 3.8–5.2)
RBC URINE: <1 /HPF
SODIUM SERPL-SCNC: 139 MMOL/L (ref 135–145)
SP GR UR STRIP: 1.01 (ref 1–1.03)
T VAGINALIS DNA VAG QL NAA+PROBE: NOT DETECTED
TSH SERPL DL<=0.005 MIU/L-ACNC: 1.77 UIU/ML (ref 0.3–4.2)
UROBILINOGEN UR STRIP-MCNC: NORMAL MG/DL
WBC # BLD AUTO: 6.2 10E3/UL (ref 4–11)
WBC URINE: <1 /HPF

## 2025-08-06 PROCEDURE — G0463 HOSPITAL OUTPT CLINIC VISIT: HCPCS

## 2025-08-06 PROCEDURE — 84155 ASSAY OF PROTEIN SERUM: CPT | Mod: ZL

## 2025-08-06 PROCEDURE — 81515 NFCT DS BV&VAGINITIS DNA ALG: CPT | Mod: ZL

## 2025-08-06 PROCEDURE — 36415 COLL VENOUS BLD VENIPUNCTURE: CPT | Mod: ZL

## 2025-08-06 PROCEDURE — 84443 ASSAY THYROID STIM HORMONE: CPT | Mod: ZL

## 2025-08-06 PROCEDURE — 85018 HEMOGLOBIN: CPT | Mod: ZL

## 2025-08-06 PROCEDURE — 81001 URINALYSIS AUTO W/SCOPE: CPT | Mod: ZL

## 2025-08-06 ASSESSMENT — PATIENT HEALTH QUESTIONNAIRE - PHQ9
SUM OF ALL RESPONSES TO PHQ QUESTIONS 1-9: 11
SUM OF ALL RESPONSES TO PHQ QUESTIONS 1-9: 11
10. IF YOU CHECKED OFF ANY PROBLEMS, HOW DIFFICULT HAVE THESE PROBLEMS MADE IT FOR YOU TO DO YOUR WORK, TAKE CARE OF THINGS AT HOME, OR GET ALONG WITH OTHER PEOPLE: SOMEWHAT DIFFICULT

## (undated) RX ORDER — FAMOTIDINE 20 MG/1
TABLET, FILM COATED ORAL
Status: DISPENSED
Start: 2024-08-11

## (undated) RX ORDER — LIDOCAINE HYDROCHLORIDE 20 MG/ML
JELLY TOPICAL
Status: DISPENSED
Start: 2025-03-12

## (undated) RX ORDER — ACETAMINOPHEN 500 MG
TABLET ORAL
Status: DISPENSED
Start: 2024-08-11

## (undated) RX ORDER — ONDANSETRON 4 MG/1
TABLET, ORALLY DISINTEGRATING ORAL
Status: DISPENSED
Start: 2024-08-11